# Patient Record
Sex: FEMALE | Race: WHITE | Employment: FULL TIME | URBAN - METROPOLITAN AREA
[De-identification: names, ages, dates, MRNs, and addresses within clinical notes are randomized per-mention and may not be internally consistent; named-entity substitution may affect disease eponyms.]

---

## 2017-07-13 RX ORDER — LEVOTHYROXINE SODIUM 0.05 MG/1
TABLET ORAL
Qty: 30 TABLET | Refills: 6 | Status: SHIPPED | OUTPATIENT
Start: 2017-07-13 | End: 2017-12-28 | Stop reason: SDUPTHER

## 2017-09-07 ENCOUNTER — OFFICE VISIT (OUTPATIENT)
Dept: INTERNAL MEDICINE CLINIC | Age: 52
End: 2017-09-07

## 2017-09-07 VITALS
WEIGHT: 119 LBS | DIASTOLIC BLOOD PRESSURE: 68 MMHG | HEART RATE: 72 BPM | BODY MASS INDEX: 19.83 KG/M2 | RESPIRATION RATE: 14 BRPM | SYSTOLIC BLOOD PRESSURE: 100 MMHG | HEIGHT: 65 IN

## 2017-09-07 DIAGNOSIS — M32.13 SYSTEMIC LUPUS ERYTHEMATOSUS WITH LUNG INVOLVEMENT, UNSPECIFIED SLE TYPE (HCC): ICD-10-CM

## 2017-09-07 DIAGNOSIS — Z00.00 PREVENTATIVE HEALTH CARE: Primary | ICD-10-CM

## 2017-09-07 DIAGNOSIS — F41.9 ANXIETY: ICD-10-CM

## 2017-09-07 PROCEDURE — 99396 PREV VISIT EST AGE 40-64: CPT | Performed by: INTERNAL MEDICINE

## 2017-09-07 RX ORDER — PAROXETINE 10 MG/1
10 TABLET, FILM COATED ORAL DAILY
Qty: 30 TABLET | Refills: 11 | Status: SHIPPED | OUTPATIENT
Start: 2017-09-07 | End: 2018-06-25 | Stop reason: SINTOL

## 2017-09-07 ASSESSMENT — PATIENT HEALTH QUESTIONNAIRE - PHQ9
2. FEELING DOWN, DEPRESSED OR HOPELESS: 0
1. LITTLE INTEREST OR PLEASURE IN DOING THINGS: 0
SUM OF ALL RESPONSES TO PHQ QUESTIONS 1-9: 0
SUM OF ALL RESPONSES TO PHQ9 QUESTIONS 1 & 2: 0

## 2017-10-05 DIAGNOSIS — M32.13 SYSTEMIC LUPUS ERYTHEMATOSUS WITH LUNG INVOLVEMENT, UNSPECIFIED SLE TYPE (HCC): Primary | ICD-10-CM

## 2017-10-05 RX ORDER — PREDNISONE 10 MG/1
TABLET ORAL
Qty: 20 TABLET | Refills: 0 | Status: SHIPPED | OUTPATIENT
Start: 2017-10-05 | End: 2017-10-15

## 2017-11-28 ENCOUNTER — OFFICE VISIT (OUTPATIENT)
Dept: INTERNAL MEDICINE CLINIC | Age: 52
End: 2017-11-28

## 2017-11-28 VITALS
HEART RATE: 58 BPM | SYSTOLIC BLOOD PRESSURE: 102 MMHG | RESPIRATION RATE: 14 BRPM | BODY MASS INDEX: 18.94 KG/M2 | OXYGEN SATURATION: 98 % | DIASTOLIC BLOOD PRESSURE: 62 MMHG | WEIGHT: 113.8 LBS

## 2017-11-28 DIAGNOSIS — F41.9 ANXIETY: Primary | ICD-10-CM

## 2017-11-28 PROCEDURE — 99213 OFFICE O/P EST LOW 20 MIN: CPT | Performed by: INTERNAL MEDICINE

## 2017-11-28 RX ORDER — LORAZEPAM 0.5 MG/1
0.5 TABLET ORAL EVERY 6 HOURS PRN
Qty: 20 TABLET | Refills: 0 | Status: SHIPPED | OUTPATIENT
Start: 2017-11-28 | End: 2018-06-25 | Stop reason: SDUPTHER

## 2017-11-28 NOTE — PROGRESS NOTES
Reji Garcia  1965 11/28/17    SUBJECTIVE:    Pt has been under a great deal of stress with her son's mental illness. She has had anxiety and panic attacks. She went to the [facility] ER with CP, SB, pre-syncope. She was found to have a panic attack. She has been seeing a counselor with some benefit. She denies SI/HI    OBJECTIVE:    /62   Pulse 58   Resp 14   Wt 113 lb 12.8 oz (51.6 kg)   SpO2 98%   Breastfeeding? No   BMI 18.94 kg/m²     Physical Exam    ASSESSMENT:    1. Anxiety        PLAN:    Candy Diaz was seen today for palpitations. Diagnoses and all orders for this visit:    Anxiety - discussed at length. Strongly encourage paxil, and use ativan PRN panic attacks. Cont counseling, will f/u in one month    Other orders  -     LORazepam (ATIVAN) 0.5 MG tablet; Take 1 tablet by mouth every 6 hours as needed for Anxiety .

## 2017-12-28 ENCOUNTER — OFFICE VISIT (OUTPATIENT)
Dept: INTERNAL MEDICINE CLINIC | Age: 52
End: 2017-12-28

## 2017-12-28 VITALS
DIASTOLIC BLOOD PRESSURE: 68 MMHG | WEIGHT: 115 LBS | HEART RATE: 76 BPM | RESPIRATION RATE: 16 BRPM | BODY MASS INDEX: 19.14 KG/M2 | SYSTOLIC BLOOD PRESSURE: 102 MMHG

## 2017-12-28 DIAGNOSIS — E03.4 HYPOTHYROIDISM DUE TO ACQUIRED ATROPHY OF THYROID: ICD-10-CM

## 2017-12-28 DIAGNOSIS — F41.9 ANXIETY: Primary | ICD-10-CM

## 2017-12-28 PROCEDURE — 99213 OFFICE O/P EST LOW 20 MIN: CPT | Performed by: INTERNAL MEDICINE

## 2017-12-28 RX ORDER — LEVOTHYROXINE SODIUM 0.05 MG/1
TABLET ORAL
Qty: 30 TABLET | Refills: 11 | Status: SHIPPED | OUTPATIENT
Start: 2017-12-28 | End: 2019-01-18 | Stop reason: SDUPTHER

## 2017-12-28 NOTE — PROGRESS NOTES
Zechariah Espino  1965 12/28/17     SUBJECTIVE:      Counseling as been very beneficial for the pt. She has been on the paxil 5mg - she did not tolerate the higher dose as it caused dilated pupils, tremulousness. She took ativan initially, but has not needed more. OBJECTIVE:    /68   Pulse 76   Resp 16   Wt 115 lb (52.2 kg)   LMP  (LMP Unknown)   Breastfeeding? No   BMI 19.14 kg/m²     Physical Exam    ASSESSMENT:    1. Anxiety    2. Hypothyroidism due to acquired atrophy of thyroid        PLAN:    Ashley Hill was seen today for 1 month follow-up. Diagnoses and all orders for this visit:    Anxiety - much improved.  Cont on paxil at the low dose; cont counseling    Hypothyroidism due to acquired atrophy of thyroid    Other orders  -     levothyroxine (SYNTHROID) 50 MCG tablet; TAKE 1 TABLET BY MOUTH ONE TIME A DAY

## 2018-01-15 ENCOUNTER — OFFICE VISIT (OUTPATIENT)
Dept: INTERNAL MEDICINE CLINIC | Age: 53
End: 2018-01-15

## 2018-01-15 VITALS
DIASTOLIC BLOOD PRESSURE: 74 MMHG | RESPIRATION RATE: 14 BRPM | HEART RATE: 56 BPM | OXYGEN SATURATION: 99 % | SYSTOLIC BLOOD PRESSURE: 124 MMHG

## 2018-01-15 DIAGNOSIS — H69.83 DYSFUNCTION OF BOTH EUSTACHIAN TUBES: Primary | ICD-10-CM

## 2018-01-15 PROCEDURE — 99213 OFFICE O/P EST LOW 20 MIN: CPT | Performed by: INTERNAL MEDICINE

## 2018-01-15 RX ORDER — PREDNISONE 10 MG/1
TABLET ORAL
Qty: 20 TABLET | Refills: 0 | Status: SHIPPED | OUTPATIENT
Start: 2018-01-15 | End: 2018-01-25

## 2018-01-15 RX ORDER — FLUTICASONE PROPIONATE 50 MCG
2 SPRAY, SUSPENSION (ML) NASAL DAILY
Qty: 1 BOTTLE | Refills: 5 | Status: SHIPPED | OUTPATIENT
Start: 2018-01-15 | End: 2020-03-27 | Stop reason: CLARIF

## 2018-01-15 NOTE — PROGRESS NOTES
Gin Arnold  1965 01/16/18    SUBJECTIVE:    Pt woke with head congestion, ear pressure, PND, tinnitus, rhinorrhea, nasal congestion, subjective fever, fatigue, decreased hearing. She denies sinus pain, ear drainage.,     She has used sudafed, meloxicam, claritin    OBJECTIVE:    /74   Pulse 56   Resp 14   LMP  (LMP Unknown)   SpO2 99%   Breastfeeding? No     Physical Exam   Constitutional: She appears well-developed. HENT:   Right Ear: External ear normal.   Left Ear: External ear normal.   Nose: Nose normal.   Mouth/Throat: No oropharyngeal exudate. Eyes: Conjunctivae are normal.   Cardiovascular: Normal rate, regular rhythm and normal heart sounds. Pulmonary/Chest: Effort normal and breath sounds normal.   Lymphadenopathy:     She has no cervical adenopathy. Neurological: She is alert. ASSESSMENT:    1. Dysfunction of both eustachian tubes        PLAN:    Dotti Blizzard was seen today for ear fullness. Diagnoses and all orders for this visit:    Dysfunction of both eustachian tubes - Tx with sudafed, prednisone, and flonase. Avoid sinus rinse  -     predniSONE (DELTASONE) 10 MG tablet;  Take 4 tablets daily for 2 days, then 3 tablets daily for 2 days, then two tablets daily for 2 days, then one tablet daily for 2 days  -     fluticasone (FLONASE) 50 MCG/ACT nasal spray; 2 sprays by Nasal route daily

## 2018-01-22 ENCOUNTER — TELEPHONE (OUTPATIENT)
Dept: INTERNAL MEDICINE CLINIC | Age: 53
End: 2018-01-22

## 2018-01-22 DIAGNOSIS — H93.11 TINNITUS OF RIGHT EAR: Primary | ICD-10-CM

## 2018-01-22 NOTE — TELEPHONE ENCOUNTER
Patient calls- she has completed all recommendations from last OV regarding ringing in both ears. Shortly after starting the Prednisone the ringing in left ear was gone. She still has ringing in right ear. She denies any cold symptoms. Still has constant PND. Patient wanted to know if she should see ENT regarding the ringing in her ear still and what can she do regarding the PND. Please advise.

## 2018-06-25 ENCOUNTER — OFFICE VISIT (OUTPATIENT)
Dept: INTERNAL MEDICINE CLINIC | Age: 53
End: 2018-06-25

## 2018-06-25 VITALS
SYSTOLIC BLOOD PRESSURE: 114 MMHG | DIASTOLIC BLOOD PRESSURE: 70 MMHG | WEIGHT: 115.1 LBS | RESPIRATION RATE: 14 BRPM | HEART RATE: 54 BPM | BODY MASS INDEX: 19.15 KG/M2 | OXYGEN SATURATION: 99 %

## 2018-06-25 DIAGNOSIS — F41.9 ANXIETY: Primary | ICD-10-CM

## 2018-06-25 PROCEDURE — 99213 OFFICE O/P EST LOW 20 MIN: CPT | Performed by: INTERNAL MEDICINE

## 2018-06-25 RX ORDER — LORAZEPAM 0.5 MG/1
0.5 TABLET ORAL EVERY 6 HOURS PRN
Qty: 20 TABLET | Refills: 0 | Status: SHIPPED | OUTPATIENT
Start: 2018-06-25 | End: 2018-07-30 | Stop reason: SDUPTHER

## 2018-07-30 ENCOUNTER — OFFICE VISIT (OUTPATIENT)
Dept: INTERNAL MEDICINE CLINIC | Age: 53
End: 2018-07-30

## 2018-07-30 VITALS
OXYGEN SATURATION: 99 % | BODY MASS INDEX: 18.21 KG/M2 | SYSTOLIC BLOOD PRESSURE: 94 MMHG | DIASTOLIC BLOOD PRESSURE: 74 MMHG | HEART RATE: 53 BPM | WEIGHT: 109.4 LBS

## 2018-07-30 DIAGNOSIS — F41.9 ANXIETY: ICD-10-CM

## 2018-07-30 PROCEDURE — 99213 OFFICE O/P EST LOW 20 MIN: CPT | Performed by: INTERNAL MEDICINE

## 2018-07-30 RX ORDER — VENLAFAXINE HYDROCHLORIDE 75 MG/1
75 CAPSULE, EXTENDED RELEASE ORAL DAILY
Qty: 30 CAPSULE | Refills: 5 | Status: SHIPPED | OUTPATIENT
Start: 2018-07-30 | End: 2018-09-06

## 2018-07-30 RX ORDER — LORAZEPAM 0.5 MG/1
0.5 TABLET ORAL EVERY 6 HOURS PRN
Qty: 20 TABLET | Refills: 0 | Status: SHIPPED | OUTPATIENT
Start: 2018-07-30 | End: 2018-09-06 | Stop reason: SDUPTHER

## 2018-07-30 RX ORDER — VENLAFAXINE HYDROCHLORIDE 37.5 MG/1
37.5 CAPSULE, EXTENDED RELEASE ORAL DAILY
Qty: 7 CAPSULE | Refills: 0 | Status: SHIPPED | OUTPATIENT
Start: 2018-07-30 | End: 2018-09-06

## 2018-09-06 ENCOUNTER — OFFICE VISIT (OUTPATIENT)
Dept: INTERNAL MEDICINE CLINIC | Age: 53
End: 2018-09-06

## 2018-09-06 VITALS
DIASTOLIC BLOOD PRESSURE: 70 MMHG | SYSTOLIC BLOOD PRESSURE: 104 MMHG | HEART RATE: 54 BPM | WEIGHT: 106.6 LBS | OXYGEN SATURATION: 100 % | RESPIRATION RATE: 18 BRPM | BODY MASS INDEX: 17.74 KG/M2

## 2018-09-06 DIAGNOSIS — E03.4 HYPOTHYROIDISM DUE TO ACQUIRED ATROPHY OF THYROID: ICD-10-CM

## 2018-09-06 DIAGNOSIS — Z00.00 ENCOUNTER FOR PREVENTIVE CARE: Primary | ICD-10-CM

## 2018-09-06 DIAGNOSIS — F41.9 ANXIETY: ICD-10-CM

## 2018-09-06 PROCEDURE — 99396 PREV VISIT EST AGE 40-64: CPT | Performed by: INTERNAL MEDICINE

## 2018-09-06 RX ORDER — BUSPIRONE HYDROCHLORIDE 5 MG/1
5 TABLET ORAL 2 TIMES DAILY
Qty: 60 TABLET | Refills: 3 | Status: SHIPPED | OUTPATIENT
Start: 2018-09-06 | End: 2018-10-12 | Stop reason: SDUPTHER

## 2018-09-06 RX ORDER — LORAZEPAM 0.5 MG/1
0.5 TABLET ORAL EVERY 6 HOURS PRN
Qty: 20 TABLET | Refills: 0 | Status: SHIPPED | OUTPATIENT
Start: 2018-09-06 | End: 2019-05-14 | Stop reason: SDUPTHER

## 2018-10-12 ENCOUNTER — OFFICE VISIT (OUTPATIENT)
Dept: INTERNAL MEDICINE CLINIC | Age: 53
End: 2018-10-12
Payer: COMMERCIAL

## 2018-10-12 VITALS
BODY MASS INDEX: 18.27 KG/M2 | SYSTOLIC BLOOD PRESSURE: 94 MMHG | WEIGHT: 109.8 LBS | DIASTOLIC BLOOD PRESSURE: 56 MMHG | OXYGEN SATURATION: 99 % | HEART RATE: 57 BPM

## 2018-10-12 DIAGNOSIS — F41.9 ANXIETY: ICD-10-CM

## 2018-10-12 PROCEDURE — 99213 OFFICE O/P EST LOW 20 MIN: CPT | Performed by: INTERNAL MEDICINE

## 2018-10-12 RX ORDER — BUSPIRONE HYDROCHLORIDE 10 MG/1
10 TABLET ORAL 2 TIMES DAILY
Qty: 60 TABLET | Refills: 11 | Status: SHIPPED | OUTPATIENT
Start: 2018-10-12 | End: 2019-05-14 | Stop reason: ALTCHOICE

## 2018-10-12 ASSESSMENT — PATIENT HEALTH QUESTIONNAIRE - PHQ9
1. LITTLE INTEREST OR PLEASURE IN DOING THINGS: 0
SUM OF ALL RESPONSES TO PHQ QUESTIONS 1-9: 0
2. FEELING DOWN, DEPRESSED OR HOPELESS: 0
SUM OF ALL RESPONSES TO PHQ QUESTIONS 1-9: 0
SUM OF ALL RESPONSES TO PHQ9 QUESTIONS 1 & 2: 0

## 2018-10-19 ENCOUNTER — TELEPHONE (OUTPATIENT)
Dept: INTERNAL MEDICINE CLINIC | Age: 53
End: 2018-10-19

## 2019-01-18 RX ORDER — LEVOTHYROXINE SODIUM 0.05 MG/1
TABLET ORAL
Qty: 30 TABLET | Refills: 10 | Status: SHIPPED | OUTPATIENT
Start: 2019-01-18 | End: 2019-12-10 | Stop reason: SDUPTHER

## 2019-01-20 ENCOUNTER — HOSPITAL ENCOUNTER (EMERGENCY)
Age: 54
Discharge: HOME | End: 2019-01-20
Attending: EMERGENCY MEDICINE
Payer: COMMERCIAL

## 2019-01-20 ENCOUNTER — APPOINTMENT (OUTPATIENT)
Dept: GENERAL RADIOLOGY | Age: 54
End: 2019-01-20
Payer: COMMERCIAL

## 2019-01-20 ENCOUNTER — APPOINTMENT (OUTPATIENT)
Dept: ULTRASOUND IMAGING | Age: 54
End: 2019-01-20
Payer: COMMERCIAL

## 2019-01-20 VITALS
BODY MASS INDEX: 19.16 KG/M2 | DIASTOLIC BLOOD PRESSURE: 74 MMHG | RESPIRATION RATE: 16 BRPM | WEIGHT: 115 LBS | TEMPERATURE: 97.8 F | HEIGHT: 65 IN | HEART RATE: 68 BPM | SYSTOLIC BLOOD PRESSURE: 130 MMHG | OXYGEN SATURATION: 98 %

## 2019-01-20 DIAGNOSIS — S93.601A SPRAIN OF RIGHT FOOT, INITIAL ENCOUNTER: Primary | ICD-10-CM

## 2019-01-20 PROCEDURE — 73630 X-RAY EXAM OF FOOT: CPT

## 2019-01-20 PROCEDURE — 99284 EMERGENCY DEPT VISIT MOD MDM: CPT

## 2019-01-20 PROCEDURE — 6370000000 HC RX 637 (ALT 250 FOR IP): Performed by: EMERGENCY MEDICINE

## 2019-01-20 PROCEDURE — 6360000002 HC RX W HCPCS: Performed by: EMERGENCY MEDICINE

## 2019-01-20 PROCEDURE — 93971 EXTREMITY STUDY: CPT

## 2019-01-20 RX ORDER — ACETAMINOPHEN AND CODEINE PHOSPHATE 300; 30 MG/1; MG/1
1 TABLET ORAL EVERY 4 HOURS PRN
Qty: 15 TABLET | Refills: 0 | Status: SHIPPED | OUTPATIENT
Start: 2019-01-20 | End: 2019-01-25

## 2019-01-20 RX ORDER — ACETAMINOPHEN AND CODEINE PHOSPHATE 300; 30 MG/1; MG/1
1 TABLET ORAL ONCE
Status: COMPLETED | OUTPATIENT
Start: 2019-01-20 | End: 2019-01-20

## 2019-01-20 RX ORDER — ONDANSETRON 4 MG/1
4 TABLET, ORALLY DISINTEGRATING ORAL ONCE
Status: COMPLETED | OUTPATIENT
Start: 2019-01-20 | End: 2019-01-20

## 2019-01-20 RX ADMIN — ACETAMINOPHEN AND CODEINE PHOSPHATE 1 TABLET: 300; 30 TABLET ORAL at 21:29

## 2019-01-20 RX ADMIN — ONDANSETRON 4 MG: 4 TABLET, ORALLY DISINTEGRATING ORAL at 21:29

## 2019-01-20 ASSESSMENT — PAIN DESCRIPTION - PAIN TYPE: TYPE: ACUTE PAIN

## 2019-01-20 ASSESSMENT — PAIN SCALES - GENERAL
PAINLEVEL_OUTOF10: 9
PAINLEVEL_OUTOF10: 9

## 2019-01-20 ASSESSMENT — PAIN DESCRIPTION - ORIENTATION: ORIENTATION: RIGHT

## 2019-05-14 ENCOUNTER — OFFICE VISIT (OUTPATIENT)
Dept: INTERNAL MEDICINE CLINIC | Age: 54
End: 2019-05-14
Payer: COMMERCIAL

## 2019-05-14 VITALS
DIASTOLIC BLOOD PRESSURE: 60 MMHG | OXYGEN SATURATION: 98 % | WEIGHT: 116.8 LBS | RESPIRATION RATE: 14 BRPM | HEART RATE: 64 BPM | BODY MASS INDEX: 19.44 KG/M2 | SYSTOLIC BLOOD PRESSURE: 102 MMHG

## 2019-05-14 DIAGNOSIS — F41.9 ANXIETY: ICD-10-CM

## 2019-05-14 PROCEDURE — 99213 OFFICE O/P EST LOW 20 MIN: CPT | Performed by: INTERNAL MEDICINE

## 2019-05-14 RX ORDER — LORAZEPAM 0.5 MG/1
0.5 TABLET ORAL EVERY 6 HOURS PRN
Qty: 20 TABLET | Refills: 0 | Status: SHIPPED | OUTPATIENT
Start: 2019-05-14 | End: 2019-07-01 | Stop reason: SDUPTHER

## 2019-05-14 ASSESSMENT — PATIENT HEALTH QUESTIONNAIRE - PHQ9
1. LITTLE INTEREST OR PLEASURE IN DOING THINGS: 0
SUM OF ALL RESPONSES TO PHQ QUESTIONS 1-9: 0
SUM OF ALL RESPONSES TO PHQ QUESTIONS 1-9: 0
SUM OF ALL RESPONSES TO PHQ9 QUESTIONS 1 & 2: 0
2. FEELING DOWN, DEPRESSED OR HOPELESS: 0

## 2019-06-07 ENCOUNTER — OFFICE VISIT (OUTPATIENT)
Dept: INTERNAL MEDICINE CLINIC | Age: 54
End: 2019-06-07
Payer: COMMERCIAL

## 2019-06-07 VITALS — OXYGEN SATURATION: 98 % | HEART RATE: 74 BPM | RESPIRATION RATE: 14 BRPM

## 2019-06-07 DIAGNOSIS — J00 ACUTE NASOPHARYNGITIS: Primary | ICD-10-CM

## 2019-06-07 PROCEDURE — 99213 OFFICE O/P EST LOW 20 MIN: CPT | Performed by: INTERNAL MEDICINE

## 2019-06-07 RX ORDER — PREDNISONE 10 MG/1
TABLET ORAL
Qty: 20 TABLET | Refills: 0 | Status: SHIPPED | OUTPATIENT
Start: 2019-06-07 | End: 2019-09-06 | Stop reason: ALTCHOICE

## 2019-06-07 RX ORDER — AZITHROMYCIN 250 MG/1
TABLET, FILM COATED ORAL
Qty: 1 PACKET | Refills: 0 | Status: SHIPPED | OUTPATIENT
Start: 2019-06-07 | End: 2019-09-06 | Stop reason: ALTCHOICE

## 2019-06-07 NOTE — PROGRESS NOTES
Gin Valdez [facility]  1965 06/07/19    SUBJECTIVE:      Pt with 8 days of PND, subjective fevers, loss of hearing on the right, tinittis on the right fatigue, SOB, poor appetite, facial swelling (but not tenderness), rhinorrhea with clear mucous. She denies cough, wheezing. She has used sudafed, claritin D.    OBJECTIVE:    Pulse 74   Resp 14   LMP  (LMP Unknown)   SpO2 98%     Physical Exam   Constitutional: She appears well-developed. HENT:   Right Ear: External ear normal.   Left Ear: External ear normal.   Nose: Nose normal.   Mouth/Throat: No oropharyngeal exudate. Eyes: Conjunctivae are normal.   Cardiovascular: Normal rate, regular rhythm and normal heart sounds. Pulmonary/Chest: Effort normal and breath sounds normal.   Lymphadenopathy:     She has no cervical adenopathy. Neurological: She is alert. ASSESSMENT:    1. Acute nasopharyngitis        PLAN:    Isabela Ho was seen today for other, dizziness and facial swelling. Diagnoses and all orders for this visit:    Acute nasopharyngitis - likely URI. Tx with prednisone and claritin d which should help ETD. If she develops fevers she can take the azith     Other orders  -     predniSONE (DELTASONE) 10 MG tablet; Take 4 tablets daily for 2 days, then 3 tablets daily for 2 days, then two tablets daily for 2 days, then one tablet daily for 2 days  -     azithromycin (ZITHROMAX Z-ROSEANNA) 250 MG tablet; Take 2 tablets (500 mg) on Day 1, and then take 1 tablet (250 mg) on days 2 through 5.

## 2019-06-11 DIAGNOSIS — H93.11 TINNITUS OF RIGHT EAR: Primary | ICD-10-CM

## 2019-06-11 DIAGNOSIS — H93.19 TINNITUS, UNSPECIFIED LATERALITY: ICD-10-CM

## 2019-06-11 DIAGNOSIS — H92.09 EAR PAIN, REFERRED, UNSPECIFIED LATERALITY: ICD-10-CM

## 2019-07-01 DIAGNOSIS — F41.9 ANXIETY: ICD-10-CM

## 2019-07-01 RX ORDER — LORAZEPAM 0.5 MG/1
0.5 TABLET ORAL EVERY 6 HOURS PRN
Qty: 20 TABLET | Refills: 0 | Status: SHIPPED | OUTPATIENT
Start: 2019-07-01 | End: 2019-07-29 | Stop reason: SDUPTHER

## 2019-07-01 RX ORDER — MELOXICAM 15 MG/1
15 TABLET ORAL DAILY PRN
Qty: 30 TABLET | Refills: 11 | Status: SHIPPED | OUTPATIENT
Start: 2019-07-01 | End: 2020-07-21 | Stop reason: SDUPTHER

## 2019-07-29 DIAGNOSIS — F41.9 ANXIETY: ICD-10-CM

## 2019-07-29 RX ORDER — LORAZEPAM 0.5 MG/1
0.5 TABLET ORAL EVERY 6 HOURS PRN
Qty: 20 TABLET | Refills: 0 | Status: SHIPPED | OUTPATIENT
Start: 2019-07-29 | End: 2019-09-06 | Stop reason: SDUPTHER

## 2019-09-03 DIAGNOSIS — F41.9 ANXIETY: ICD-10-CM

## 2019-09-03 RX ORDER — LORAZEPAM 0.5 MG/1
0.5 TABLET ORAL EVERY 6 HOURS PRN
Qty: 20 TABLET | Refills: 0 | OUTPATIENT
Start: 2019-09-03 | End: 2019-10-03

## 2019-09-06 ENCOUNTER — OFFICE VISIT (OUTPATIENT)
Dept: INTERNAL MEDICINE CLINIC | Age: 54
End: 2019-09-06
Payer: COMMERCIAL

## 2019-09-06 VITALS
HEART RATE: 66 BPM | OXYGEN SATURATION: 99 % | SYSTOLIC BLOOD PRESSURE: 96 MMHG | DIASTOLIC BLOOD PRESSURE: 62 MMHG | RESPIRATION RATE: 14 BRPM

## 2019-09-06 DIAGNOSIS — F41.9 ANXIETY: ICD-10-CM

## 2019-09-06 PROCEDURE — 99213 OFFICE O/P EST LOW 20 MIN: CPT | Performed by: INTERNAL MEDICINE

## 2019-09-06 RX ORDER — FLUOXETINE 10 MG/1
10 CAPSULE ORAL DAILY
Qty: 30 CAPSULE | Refills: 5 | Status: SHIPPED | OUTPATIENT
Start: 2019-09-06 | End: 2019-10-15

## 2019-09-06 RX ORDER — LORAZEPAM 0.5 MG/1
0.5 TABLET ORAL EVERY 6 HOURS PRN
Qty: 20 TABLET | Refills: 0 | Status: SHIPPED | OUTPATIENT
Start: 2019-09-06 | End: 2019-10-06

## 2019-09-06 NOTE — PROGRESS NOTES
Gin Henderson [place]  1965 09/06/19    SUBJECTIVE:      Pt has been having benefit with ativan for anxiety. She did not tolerate zoloft, paxil, buspar, and effexor. OBJECTIVE:    BP 96/62   Pulse 66   Resp 14   LMP  (LMP Unknown)   SpO2 99%     Physical Exam    ASSESSMENT:    1. Anxiety        PLAN:    Poncho Randall was seen today for discuss medications. Diagnoses and all orders for this visit:    Anxiety- I will start the patient on Prozac 10 mg daily. Also will use Ativan as needed. -     LORazepam (ATIVAN) 0.5 MG tablet; Take 1 tablet by mouth every 6 hours as needed for Anxiety for up to 30 days. Other orders  -     FLUoxetine (PROZAC) 10 MG capsule;  Take 1 capsule by mouth daily

## 2019-10-15 ENCOUNTER — OFFICE VISIT (OUTPATIENT)
Dept: INTERNAL MEDICINE CLINIC | Age: 54
End: 2019-10-15
Payer: COMMERCIAL

## 2019-10-15 VITALS
DIASTOLIC BLOOD PRESSURE: 62 MMHG | SYSTOLIC BLOOD PRESSURE: 118 MMHG | BODY MASS INDEX: 19.13 KG/M2 | WEIGHT: 114.8 LBS | HEART RATE: 61 BPM | OXYGEN SATURATION: 98 % | HEIGHT: 65 IN

## 2019-10-15 DIAGNOSIS — F41.9 ANXIETY: ICD-10-CM

## 2019-10-15 DIAGNOSIS — Z00.00 ENCOUNTER FOR PREVENTIVE CARE: Primary | ICD-10-CM

## 2019-10-15 PROCEDURE — 99396 PREV VISIT EST AGE 40-64: CPT | Performed by: INTERNAL MEDICINE

## 2019-11-27 ENCOUNTER — OFFICE VISIT (OUTPATIENT)
Dept: INTERNAL MEDICINE CLINIC | Age: 54
End: 2019-11-27
Payer: COMMERCIAL

## 2019-11-27 VITALS
RESPIRATION RATE: 14 BRPM | HEART RATE: 58 BPM | DIASTOLIC BLOOD PRESSURE: 60 MMHG | OXYGEN SATURATION: 98 % | SYSTOLIC BLOOD PRESSURE: 122 MMHG

## 2019-11-27 DIAGNOSIS — M54.50 ACUTE RIGHT-SIDED LOW BACK PAIN WITHOUT SCIATICA: Primary | ICD-10-CM

## 2019-11-27 PROCEDURE — 99213 OFFICE O/P EST LOW 20 MIN: CPT | Performed by: INTERNAL MEDICINE

## 2019-11-27 RX ORDER — CYCLOBENZAPRINE HCL 5 MG
5 TABLET ORAL 3 TIMES DAILY PRN
Qty: 30 TABLET | Refills: 3 | Status: SHIPPED | OUTPATIENT
Start: 2019-11-27 | End: 2019-12-07

## 2019-12-02 ENCOUNTER — TELEPHONE (OUTPATIENT)
Dept: INTERNAL MEDICINE CLINIC | Age: 54
End: 2019-12-02

## 2019-12-02 DIAGNOSIS — M54.16 LUMBAR RADICULOPATHY: Primary | ICD-10-CM

## 2019-12-03 DIAGNOSIS — M54.16 LUMBAR RADICULOPATHY: Primary | ICD-10-CM

## 2019-12-10 RX ORDER — LEVOTHYROXINE SODIUM 0.05 MG/1
TABLET ORAL
Qty: 30 TABLET | Refills: 9 | Status: SHIPPED | OUTPATIENT
Start: 2019-12-10 | End: 2020-10-09

## 2020-02-06 ENCOUNTER — TELEPHONE (OUTPATIENT)
Dept: INTERNAL MEDICINE CLINIC | Age: 55
End: 2020-02-06

## 2020-02-28 ENCOUNTER — OFFICE VISIT (OUTPATIENT)
Dept: INTERNAL MEDICINE CLINIC | Age: 55
End: 2020-02-28
Payer: COMMERCIAL

## 2020-02-28 ENCOUNTER — TELEPHONE (OUTPATIENT)
Dept: INTERNAL MEDICINE CLINIC | Age: 55
End: 2020-02-28

## 2020-02-28 VITALS
TEMPERATURE: 98.4 F | DIASTOLIC BLOOD PRESSURE: 68 MMHG | HEART RATE: 61 BPM | BODY MASS INDEX: 18.96 KG/M2 | OXYGEN SATURATION: 98 % | HEIGHT: 65 IN | WEIGHT: 113.8 LBS | SYSTOLIC BLOOD PRESSURE: 102 MMHG

## 2020-02-28 PROCEDURE — 99213 OFFICE O/P EST LOW 20 MIN: CPT | Performed by: INTERNAL MEDICINE

## 2020-02-28 NOTE — PROGRESS NOTES
Gin Townsend  1965 02/28/20    SUBJECTIVE:    Wednesday pt developed chills, intermittent fevers, PND, dry cough, headaches, myalgias. She denies sinus pain, rhinorrhea, nasal congestion, SOB, wheezing. She has used tylenol cold and flu. OBJECTIVE:    /68 (Site: Left Upper Arm, Position: Sitting, Cuff Size: Medium Adult)   Pulse 61   Temp 98.4 °F (36.9 °C)   Ht 5' 5\" (1.651 m)   Wt 113 lb 12.8 oz (51.6 kg)   LMP  (LMP Unknown)   SpO2 98%   Breastfeeding No   BMI 18.94 kg/m²     Physical Exam  Constitutional:       Appearance: She is well-developed. HENT:      Right Ear: External ear normal.      Left Ear: External ear normal.      Nose: Nose normal.      Mouth/Throat:      Pharynx: No oropharyngeal exudate. Eyes:      Conjunctiva/sclera: Conjunctivae normal.   Cardiovascular:      Rate and Rhythm: Normal rate and regular rhythm. Heart sounds: Normal heart sounds. Pulmonary:      Effort: Pulmonary effort is normal.      Breath sounds: Normal breath sounds. Lymphadenopathy:      Cervical: No cervical adenopathy. Neurological:      Mental Status: She is alert. ASSESSMENT:    1. Upper respiratory tract infection, unspecified type        PLAN:    Arcadio Natali was seen today for cough, congestion, headache, fever and generalized body aches. Diagnoses and all orders for this visit:    Upper respiratory tract infection, unspecified type - rapid flu (-). Tx symptoms with tylenol, cough med, rest, fluids.  No indication for abx

## 2020-03-02 ENCOUNTER — TELEPHONE (OUTPATIENT)
Dept: INTERNAL MEDICINE CLINIC | Age: 55
End: 2020-03-02

## 2020-03-02 RX ORDER — BENZONATATE 100 MG/1
100-200 CAPSULE ORAL 3 TIMES DAILY PRN
Qty: 60 CAPSULE | Refills: 0 | Status: SHIPPED | OUTPATIENT
Start: 2020-03-02 | End: 2020-03-09

## 2020-03-02 NOTE — TELEPHONE ENCOUNTER
Patient called in she stated she is no better she is coughing bad, having fever, and it is now starting as sinus issues. She started she has a zpak she had on hand she started on and fever comes and goes. Patient wanted to know is there anything else she may try that may help her.

## 2020-03-03 ENCOUNTER — TELEPHONE (OUTPATIENT)
Dept: INTERNAL MEDICINE CLINIC | Age: 55
End: 2020-03-03

## 2020-03-03 RX ORDER — ALBUTEROL SULFATE 90 UG/1
2 AEROSOL, METERED RESPIRATORY (INHALATION) 4 TIMES DAILY PRN
Qty: 1 INHALER | Refills: 5 | Status: SHIPPED | OUTPATIENT
Start: 2020-03-03 | End: 2020-03-27 | Stop reason: CLARIF

## 2020-03-03 NOTE — TELEPHONE ENCOUNTER
Spoke with patient about her scripts being sent in. Patient is now stating that she is wheezing and SOB and was wondering if she can get an inhaler called in as well. Also she had diarrhea all day yesterday.

## 2020-03-05 ENCOUNTER — TELEPHONE (OUTPATIENT)
Dept: INTERNAL MEDICINE CLINIC | Age: 55
End: 2020-03-05

## 2020-03-05 RX ORDER — PREDNISONE 10 MG/1
TABLET ORAL
Qty: 20 TABLET | Refills: 0 | Status: SHIPPED | OUTPATIENT
Start: 2020-03-05 | End: 2020-03-27 | Stop reason: CLARIF

## 2020-03-27 ENCOUNTER — OFFICE VISIT (OUTPATIENT)
Dept: INTERNAL MEDICINE CLINIC | Age: 55
End: 2020-03-27
Payer: COMMERCIAL

## 2020-03-27 VITALS
RESPIRATION RATE: 16 BRPM | BODY MASS INDEX: 18.23 KG/M2 | WEIGHT: 109.4 LBS | DIASTOLIC BLOOD PRESSURE: 61 MMHG | HEIGHT: 65 IN | OXYGEN SATURATION: 98 % | HEART RATE: 55 BPM | SYSTOLIC BLOOD PRESSURE: 100 MMHG

## 2020-03-27 PROCEDURE — 99213 OFFICE O/P EST LOW 20 MIN: CPT | Performed by: NURSE PRACTITIONER

## 2020-03-27 RX ORDER — PREDNISONE 10 MG/1
TABLET ORAL
Qty: 20 TABLET | Refills: 0 | Status: SHIPPED | OUTPATIENT
Start: 2020-03-27 | End: 2020-04-14

## 2020-03-27 ASSESSMENT — ENCOUNTER SYMPTOMS
CHEST TIGHTNESS: 0
ABDOMINAL PAIN: 0
COUGH: 0
SINUS PAIN: 0
NAUSEA: 0
APNEA: 0
COLOR CHANGE: 0
VOMITING: 0
DIARRHEA: 0
SHORTNESS OF BREATH: 0
SINUS PRESSURE: 0

## 2020-03-27 ASSESSMENT — PATIENT HEALTH QUESTIONNAIRE - PHQ9
DEPRESSION UNABLE TO ASSESS: FUNCTIONAL CAPACITY MOTIVATION LIMITS ACCURACY
2. FEELING DOWN, DEPRESSED OR HOPELESS: 0
SUM OF ALL RESPONSES TO PHQ QUESTIONS 1-9: 0
SUM OF ALL RESPONSES TO PHQ QUESTIONS 1-9: 0
SUM OF ALL RESPONSES TO PHQ9 QUESTIONS 1 & 2: 0
1. LITTLE INTEREST OR PLEASURE IN DOING THINGS: 0

## 2020-03-27 NOTE — PROGRESS NOTES
Tyreltyrel Rios  1965 03/27/20    Chief Complaint   Patient presents with    Flank Pain     right flank pain sx started yesterday pt states she has no known injury        SUBJECTIVE:      Beth Huitron is a current patient of Dr Mega Zepeda who presents to the office this morning for c/o right lateral rib cage which has been ongoing over the last 48 hours. The patient states that since she has been laid off from her job, she has been keeping active cleaning and moving furniture at their home and slipped, falling onto a desk hitting her right side. This pain started the following morning. She also gets pain with deep inspiration. No chest pain or dyspnea. She has not tried anything for the pain. She also recently recovered from a fairly lengthy viral respiratory illness. Review of Systems   Constitutional: Negative for activity change, appetite change, fatigue and fever. HENT: Negative for congestion, nosebleeds, sinus pressure and sinus pain. Respiratory: Negative for apnea, cough, chest tightness and shortness of breath. Cardiovascular: Negative for chest pain and palpitations. Gastrointestinal: Negative for abdominal pain, diarrhea, nausea and vomiting. Genitourinary: Negative for difficulty urinating, flank pain and hematuria. Musculoskeletal: Positive for arthralgias. Negative for joint swelling and myalgias. Skin: Negative for color change and rash. Neurological: Negative for dizziness, light-headedness and headaches. Psychiatric/Behavioral: Negative. Negative for behavioral problems. OBJECTIVE:    /61   Pulse 55   Resp 16   Ht 5' 5\" (1.651 m)   Wt 109 lb 6.4 oz (49.6 kg)   LMP  (LMP Unknown)   SpO2 98%   BMI 18.21 kg/m²     Physical Exam  Constitutional:       General: She is not in acute distress. Appearance: She is well-developed. She is not diaphoretic. HENT:      Head: Normocephalic and atraumatic.    Neck:      Musculoskeletal: Normal range of motion and neck supple. No edema, erythema or muscular tenderness. Cardiovascular:      Rate and Rhythm: Normal rate and regular rhythm. Heart sounds: Normal heart sounds. No murmur. No friction rub. Pulmonary:      Effort: Pulmonary effort is normal. No respiratory distress. Breath sounds: Normal breath sounds. Chest:      Chest wall: Tenderness present. No deformity or crepitus. Abdominal:      General: Bowel sounds are normal.      Palpations: Abdomen is soft. Musculoskeletal: Normal range of motion. General: Tenderness present. Skin:     General: Skin is warm and dry. Capillary Refill: Capillary refill takes less than 2 seconds. Neurological:      Mental Status: She is alert and oriented to person, place, and time. Coordination: Coordination normal.   Psychiatric:         Behavior: Behavior normal.         Thought Content: Thought content normal.         ASSESSMENT:    1. Rib pain on right side        PLAN:    Livier Granger was seen today for flank pain. Diagnoses and all orders for this visit:    Rib pain on right side- less likely resp issue/pleuritic pain and more likely a contusion. Given known trauma, I will obtain CXR to r/o fracture. Pt going to use home Mobic and can also add pred taper. Practice deep breathing exercises multiple times daily for pneumonia prevention. -     predniSONE (DELTASONE) 10 MG tablet; Take 4 tablets daily for 2 days, then 3 tablets for 2 days, 2 tablets for 2 days, then 1 tablet for 2 days  -     XR CHEST STANDARD (2 VW)        RX Monitoring 9/6/2019   Periodic Controlled Substance Monitoring Possible medication side effects, risk of tolerance/dependence & alternative treatments discussed. ;No signs of potential drug abuse or diversion identified. Return if symptoms worsen or fail to improve.

## 2020-03-30 ENCOUNTER — TELEPHONE (OUTPATIENT)
Dept: INTERNAL MEDICINE CLINIC | Age: 55
End: 2020-03-30

## 2020-03-30 RX ORDER — TRAMADOL HYDROCHLORIDE 50 MG/1
50 TABLET ORAL EVERY 8 HOURS PRN
Qty: 15 TABLET | Refills: 0 | Status: SHIPPED | OUTPATIENT
Start: 2020-03-30 | End: 2020-04-04

## 2020-03-30 NOTE — TELEPHONE ENCOUNTER
Pt called stating she is still having pain when breathing and her whole front side of her ribs are painful. She stated prednisone has not touched it. Please advise.

## 2020-04-14 ENCOUNTER — TELEMEDICINE (OUTPATIENT)
Dept: INTERNAL MEDICINE CLINIC | Age: 55
End: 2020-04-14
Payer: COMMERCIAL

## 2020-04-14 PROCEDURE — 99213 OFFICE O/P EST LOW 20 MIN: CPT | Performed by: INTERNAL MEDICINE

## 2020-05-07 ENCOUNTER — HOSPITAL ENCOUNTER (OUTPATIENT)
Age: 55
Setting detail: SPECIMEN
Discharge: HOME | End: 2020-05-07

## 2020-05-07 PROCEDURE — U0002 COVID-19 LAB TEST NON-CDC: HCPCS

## 2020-05-09 LAB
SARS-COV-2: NOT DETECTED
SOURCE: NORMAL

## 2020-05-22 ENCOUNTER — TELEPHONE (OUTPATIENT)
Dept: INTERNAL MEDICINE CLINIC | Age: 55
End: 2020-05-22

## 2020-07-21 RX ORDER — MELOXICAM 15 MG/1
15 TABLET ORAL DAILY PRN
Qty: 30 TABLET | Refills: 11 | Status: SHIPPED | OUTPATIENT
Start: 2020-07-21 | End: 2021-09-14

## 2020-10-09 RX ORDER — LEVOTHYROXINE SODIUM 0.05 MG/1
TABLET ORAL
Qty: 30 TABLET | Refills: 0 | Status: SHIPPED | OUTPATIENT
Start: 2020-10-09 | End: 2020-10-19 | Stop reason: SDUPTHER

## 2020-10-19 ENCOUNTER — OFFICE VISIT (OUTPATIENT)
Dept: INTERNAL MEDICINE CLINIC | Age: 55
End: 2020-10-19
Payer: COMMERCIAL

## 2020-10-19 VITALS
RESPIRATION RATE: 18 BRPM | OXYGEN SATURATION: 100 % | BODY MASS INDEX: 18.97 KG/M2 | HEART RATE: 56 BPM | SYSTOLIC BLOOD PRESSURE: 110 MMHG | WEIGHT: 114 LBS | DIASTOLIC BLOOD PRESSURE: 66 MMHG

## 2020-10-19 PROCEDURE — 99396 PREV VISIT EST AGE 40-64: CPT | Performed by: INTERNAL MEDICINE

## 2020-10-19 PROCEDURE — 90715 TDAP VACCINE 7 YRS/> IM: CPT | Performed by: INTERNAL MEDICINE

## 2020-10-19 PROCEDURE — 90471 IMMUNIZATION ADMIN: CPT | Performed by: INTERNAL MEDICINE

## 2020-10-19 RX ORDER — LEVOTHYROXINE SODIUM 0.05 MG/1
TABLET ORAL
Qty: 30 TABLET | Refills: 11 | Status: SHIPPED | OUTPATIENT
Start: 2020-10-19 | End: 2021-11-04

## 2020-10-19 NOTE — PROGRESS NOTES
Gin Jones  1965  10/19/20    SUBJECTIVE:      Pt continues on synthroid for hypothyroidism. She has not been getting as much exercise as she had in the past.     She intermittently is using mobic for LBP. OBJECTIVE:    /66   Pulse 56   Resp 18   Wt 114 lb (51.7 kg)   LMP  (LMP Unknown)   SpO2 100%   BMI 18.97 kg/m²     Physical Exam  Constitutional:       Appearance: She is well-developed. Eyes:      General: No scleral icterus. Conjunctiva/sclera: Conjunctivae normal.   Neck:      Musculoskeletal: Neck supple. Thyroid: No thyromegaly. Trachea: No tracheal deviation. Cardiovascular:      Rate and Rhythm: Normal rate and regular rhythm. Heart sounds: No murmur. No friction rub. No gallop. Pulmonary:      Effort: No respiratory distress. Breath sounds: No wheezing or rales. Abdominal:      General: Bowel sounds are normal. There is no distension. Palpations: Abdomen is soft. There is no hepatomegaly or mass. Tenderness: There is no abdominal tenderness. There is no guarding or rebound. Lymphadenopathy:      Cervical: No cervical adenopathy. Skin:     Nails: There is no clubbing. Neurological:      Mental Status: She is alert and oriented to person, place, and time. Psychiatric:         Behavior: Behavior normal.         Judgment: Judgment normal.         ASSESSMENT:    1. Encounter for preventive care    2. Hypothyroidism due to acquired atrophy of thyroid    3. Need for Tdap vaccination        PLAN:    Tito Nassar was seen today for other. Diagnoses and all orders for this visit:    Encounter for preventive care - encourage exercise; BP at goal; up to date on gy care, mammo; up to date on flu shot; up to date on c-scope; needs shingles shot; tdap today  -     Comprehensive Metabolic Panel; Future  -     Lipid Panel; Future  -     TSH without Reflex;  Future    Hypothyroidism due to acquired atrophy of thyroid - check TSH  -     TSH

## 2020-10-22 ENCOUNTER — TELEPHONE (OUTPATIENT)
Dept: INTERNAL MEDICINE CLINIC | Age: 55
End: 2020-10-22

## 2020-10-22 NOTE — TELEPHONE ENCOUNTER
That is not an allergy - this is a local reaction due to a healthy immune response.  No danger from this

## 2020-10-22 NOTE — TELEPHONE ENCOUNTER
Pt called in stating that she had a reaction to the tdap she said the site was red, swolle, and a small rash and would like that added in her chart.

## 2020-12-15 ENCOUNTER — TELEPHONE (OUTPATIENT)
Dept: INTERNAL MEDICINE CLINIC | Age: 55
End: 2020-12-15

## 2020-12-15 NOTE — TELEPHONE ENCOUNTER
Pt called asking if she should get the COVID vaccine. Spoke w/ pt and told her yes per Dr. Lola Powell (verbal), when it's available. Spiritual Plan of Care    Description  Writer was paged to visit family facing decision about withdrawing life support. Three family members were present: wife of pt and parents of pt. Daughter, Rosaura, arrived at the very end of the visit.  and nurse were present for the conference.  explained the pt's poor prognosis, that the pt would likely never wake up, and that withdrawal of the breathing tube would likely lead to aspiration pneumonia. Mother said repeatedly, \"Dmitriy wouldn't want to live like this. This is not what he would want.\" Father relayed his experience of \"beating cancer\" and stated he wasn't ready for his son \"to go. I have plans. I have plans.\" Wife was at pt's bedside throughout holding pt's hand. Family has a Gnosticist background but are not practicing.    Assessment  Pt mother has largely accepted the prognosis and the fact that her son is not going to survive this illness. Father has not yet accepted this reality, and admitted to this. Spouse understands the medical reality, but seems unsure about withdrawing life support. She said a couple of times, \"Dmitriy wouldn't like to be like this.\" According to nurse, spouse is ready to withdraw life support but wants everyone to be comfortable.    Interventions  Subhash out father's questions about pt medical condition and prognosis. Reminded everyone that the question to answer is, What would Dmitriy want? Also reminded that timing of withdrawing life support is a decision that falls to immediate family. Counseled giving adequate time for family goodbyes. Mementos that can be used - thumbprints, etc - introduced to family but since a son was not present family decided to delay.    Recommendation  Give the family additional time to process and accept the pt's poor prognosis, to say goodbye, and to make memories using materials from the grief cart, if desired.   support was offered via page tonight or through staff  visit tomorrow.      Referral source: Consult    Reason for visit: Family Conference    Visited with: Family/Friend    Patient Assessment: Unable to assess    Family/Friend Assessment: Denial, Distrusts Process, Grief , Helpless, Support system, Sad    Family/Friend  Intervention: Advocate, Clarify Feelings,  Support, Crisis Intervention, Empathic Listening, Exploration, Grief Support, Ethical Guidance

## 2021-04-08 ENCOUNTER — VIRTUAL VISIT (OUTPATIENT)
Dept: INTERNAL MEDICINE CLINIC | Age: 56
End: 2021-04-08
Payer: COMMERCIAL

## 2021-04-08 DIAGNOSIS — J01.20 ACUTE NON-RECURRENT ETHMOIDAL SINUSITIS: Primary | ICD-10-CM

## 2021-04-08 PROCEDURE — 99213 OFFICE O/P EST LOW 20 MIN: CPT | Performed by: INTERNAL MEDICINE

## 2021-04-08 RX ORDER — AZITHROMYCIN 250 MG/1
250 TABLET, FILM COATED ORAL SEE ADMIN INSTRUCTIONS
Qty: 6 TABLET | Refills: 0 | Status: SHIPPED | OUTPATIENT
Start: 2021-04-08 | End: 2021-04-13

## 2021-04-08 ASSESSMENT — PATIENT HEALTH QUESTIONNAIRE - PHQ9
SUM OF ALL RESPONSES TO PHQ9 QUESTIONS 1 & 2: 0
SUM OF ALL RESPONSES TO PHQ QUESTIONS 1-9: 0
SUM OF ALL RESPONSES TO PHQ QUESTIONS 1-9: 0

## 2021-04-08 NOTE — PROGRESS NOTES
2021    TELEHEALTH EVALUATION -- Audio/Visual (During LTXJE-23 public health emergency)      Had to be changed to a telephone visit because of technical difficulties  HPI:    Sary Kaufman (:  1965) has requested an audio/video evaluation for the following concern(s):    Pt with ear congestion x 10 days. 2 days ago she developed fever to 100.3, myalgias, HA, PND, sinus congestion, mild dry cough, ethmoid sinus pain, rhinorrhea with clear mucous. She denies SOB, wheezing, N/V, diarrhea, loss of taste or smell. She has used sudafed with benefit. Review of Systems    Prior to Visit Medications    Medication Sig Taking? Authorizing Provider   azithromycin (ZITHROMAX) 250 MG tablet Take 1 tablet by mouth See Admin Instructions for 5 days 500mg on day 1 followed by 250mg on days 2 - 5 Yes Erik García MD   levothyroxine (SYNTHROID) 50 MCG tablet TAKE 1 TABLET BY MOUTH ONE TIME A DAY Yes Erik García MD   meloxicam (MOBIC) 15 MG tablet Take 1 tablet by mouth daily as needed for Pain Yes Erik García MD   UNABLE TO FIND Please administer two SHINGRIX vaccines 2-6 months apart  Patient not taking: Reported on 2021  rEik García MD       Social History     Tobacco Use    Smoking status: Never Smoker    Smokeless tobacco: Never Used   Substance Use Topics    Alcohol use: Yes     Alcohol/week: 5.0 standard drinks     Types: 5 Glasses of wine per week     Comment: 1 5x per week    Drug use: No          PHYSICAL EXAMINATION:    Constitutional: [x] Appears well-developed and well-nourished [x] No apparent distress      [] Abnormal-   Mental status  [x] Alert and awake  [x] Oriented to person/place/time [x]Able to follow commands             Psychiatric:       [x] Normal Affect [x] No Hallucinations      ASSESSMENT/PLAN:  1. Acute non-recurrent ethmoidal sinusitis - likely viral, but could be bacterial. Use OTC cold med and sinus rinse.  Of she has another fever or symptoms are not improving in the next few days she will take azith  - azithromycin (ZITHROMAX) 250 MG tablet; Take 1 tablet by mouth See Admin Instructions for 5 days 500mg on day 1 followed by 250mg on days 2 - 5  Dispense: 6 tablet; Refill: 0      No follow-ups on file. Gin Ribeiro, was evaluated through a synchronous (real-time) audio-video encounter. The patient (or guardian if applicable) is aware that this is a billable service. Verbal consent to proceed has been obtained within the past 12 months. The visit was conducted pursuant to the emergency declaration under the [address] [place] [address], [address] [place] [address] authority and the [email or web address] General Act. Patient identification was verified, and a caregiver was present when appropriate. The patient was located in a state where the provider was credentialed to provide care. Total time spent on this encounter: Not billed by time    --Leilani Nicole MD on 4/8/2021 at 12:47 PM    An electronic signature was used to authenticate this note.

## 2021-09-14 RX ORDER — MELOXICAM 15 MG/1
TABLET ORAL
Qty: 30 TABLET | Refills: 0 | Status: SHIPPED | OUTPATIENT
Start: 2021-09-14 | End: 2022-01-03

## 2021-10-25 ENCOUNTER — OFFICE VISIT (OUTPATIENT)
Dept: INTERNAL MEDICINE CLINIC | Age: 56
End: 2021-10-25
Payer: COMMERCIAL

## 2021-10-25 VITALS
OXYGEN SATURATION: 99 % | RESPIRATION RATE: 18 BRPM | WEIGHT: 117 LBS | BODY MASS INDEX: 19.47 KG/M2 | SYSTOLIC BLOOD PRESSURE: 110 MMHG | HEART RATE: 57 BPM | DIASTOLIC BLOOD PRESSURE: 76 MMHG

## 2021-10-25 DIAGNOSIS — M32.13 SYSTEMIC LUPUS ERYTHEMATOSUS WITH LUNG INVOLVEMENT, UNSPECIFIED SLE TYPE (HCC): ICD-10-CM

## 2021-10-25 DIAGNOSIS — M85.80 OSTEOPENIA, UNSPECIFIED LOCATION: ICD-10-CM

## 2021-10-25 DIAGNOSIS — Z00.00 ENCOUNTER FOR PREVENTIVE CARE: Primary | ICD-10-CM

## 2021-10-25 DIAGNOSIS — E03.4 HYPOTHYROIDISM DUE TO ACQUIRED ATROPHY OF THYROID: ICD-10-CM

## 2021-10-25 PROCEDURE — 99396 PREV VISIT EST AGE 40-64: CPT | Performed by: INTERNAL MEDICINE

## 2021-10-25 NOTE — PROGRESS NOTES
fatigue. Diagnoses and all orders for this visit:    Encounter for preventive care - check labs; encourage exercise; BP at goal; encourage flu shot; encourage shingles shot; up to date on covid shot; up to date oin GYN care, c-scope  -     Comprehensive Metabolic Panel; Future  -     CBC Auto Differential; Future  -     TSH without Reflex; Future  -     Lipid Panel; Future  -     HINA; Future  -     Rheumatoid Factor; Future  -     C-Reactive Protein; Future  -     Sedimentation Rate; Future  -     PTH, INTACT; Future  -     Vitamin D 25 Hydroxy; Future    Hypothyroidism due to acquired atrophy of thyroid - check labs    Systemic lupus erythematosus with lung involvement, unspecified SLE type ([facility] [address] - check labs - some of her symptoms are concerning for recurrence of the SLE  -     HINA; Future  -     Rheumatoid Factor; Future  -     C-Reactive Protein; Future  -     Sedimentation Rate; Future  -     PTH, INTACT; Future  -     Vitamin D 25 Hydroxy; Future    Osteopenia, unspecified location - check labs  -     HINA; Future  -     Rheumatoid Factor; Future  -     C-Reactive Protein; Future  -     Sedimentation Rate;  Future

## 2022-01-03 RX ORDER — MELOXICAM 15 MG/1
TABLET ORAL
Qty: 30 TABLET | Refills: 0 | Status: SHIPPED | OUTPATIENT
Start: 2022-01-03 | End: 2022-01-31

## 2022-01-17 ENCOUNTER — HOSPITAL ENCOUNTER (OUTPATIENT)
Age: 57
Setting detail: SPECIMEN
Discharge: HOME | End: 2022-01-17
Payer: COMMERCIAL

## 2022-01-17 ENCOUNTER — OFFICE VISIT (OUTPATIENT)
Dept: OBGYN | Age: 57
End: 2022-01-17
Payer: COMMERCIAL

## 2022-01-17 VITALS
HEIGHT: 65 IN | SYSTOLIC BLOOD PRESSURE: 109 MMHG | BODY MASS INDEX: 20.33 KG/M2 | DIASTOLIC BLOOD PRESSURE: 71 MMHG | WEIGHT: 122 LBS

## 2022-01-17 DIAGNOSIS — D25.1 FIBROIDS, INTRAMURAL: ICD-10-CM

## 2022-01-17 DIAGNOSIS — D06.9 CIN III (CERVICAL INTRAEPITHELIAL NEOPLASIA GRADE III) WITH SEVERE DYSPLASIA: Primary | ICD-10-CM

## 2022-01-17 PROCEDURE — 99213 OFFICE O/P EST LOW 20 MIN: CPT | Performed by: OBSTETRICS & GYNECOLOGY

## 2022-01-17 PROCEDURE — 88142 CYTOPATH C/V THIN LAYER: CPT

## 2022-01-17 PROCEDURE — 87624 HPV HI-RISK TYP POOLED RSLT: CPT

## 2022-01-17 SDOH — ECONOMIC STABILITY: FOOD INSECURITY: WITHIN THE PAST 12 MONTHS, THE FOOD YOU BOUGHT JUST DIDN'T LAST AND YOU DIDN'T HAVE MONEY TO GET MORE.: NEVER TRUE

## 2022-01-17 SDOH — ECONOMIC STABILITY: FOOD INSECURITY: WITHIN THE PAST 12 MONTHS, YOU WORRIED THAT YOUR FOOD WOULD RUN OUT BEFORE YOU GOT MONEY TO BUY MORE.: NEVER TRUE

## 2022-01-17 ASSESSMENT — ENCOUNTER SYMPTOMS
EYES NEGATIVE: 1
GASTROINTESTINAL NEGATIVE: 1
RESPIRATORY NEGATIVE: 1
ALLERGIC/IMMUNOLOGIC NEGATIVE: 1

## 2022-01-17 ASSESSMENT — SOCIAL DETERMINANTS OF HEALTH (SDOH): HOW HARD IS IT FOR YOU TO PAY FOR THE VERY BASICS LIKE FOOD, HOUSING, MEDICAL CARE, AND HEATING?: NOT HARD AT ALL

## 2022-01-17 NOTE — PROGRESS NOTES
1/17/22    Gin Power  1965    Chief Complaint   Patient presents with    Abnormal Pap Smear     pt here for 6 mth pap smear, Last pap 7/14/21 normal, pap 4/19/21 normal, LEEP 1/29/21 HSIL, Colposcopy 1/15/21 LGSIL, pap 1/5/21 LGSIL. Ankur Briones is a 61 y.o. female who presents today for evaluation of abnormal pap smear ad to follow up on fibroids. Past Medical History:   Diagnosis Date    Allergic rhinitis     Cervical herniated disc     7/14 MRI C3-4 herniation    Elevated SGOT (AST)     Fracture of sacrum (HCC)     Gastrointestinal bleeding     Herniated lumbar intervertebral disc     Hypothyroidism     Osteoarthritis     Polyp of gallbladder     Systemic lupus (HCC)        Past Surgical History:   Procedure Laterality Date    BREAST REDUCTION SURGERY  1988    FINGER SURGERY      1990       Social History     Tobacco Use    Smoking status: Never Smoker    Smokeless tobacco: Never Used   Vaping Use    Vaping Use: Never used   Substance Use Topics    Alcohol use: Yes     Alcohol/week: 5.0 standard drinks     Types: 5 Glasses of wine per week     Comment: 1 5x per week    Drug use: No       Family History   Problem Relation Age of Onset    Heart Disease Father     High Blood Pressure Father     High Cholesterol Father        Current Outpatient Medications   Medication Sig Dispense Refill    meloxicam (MOBIC) 15 MG tablet TAKE 1 TABLET BY MOUTH EVERY DAY AS NEEDED FOR PAIN 30 tablet 0    levothyroxine (SYNTHROID) 50 MCG tablet TAKE 1 TABLET BY MOUTH EVERY DAY 30 tablet 11     No current facility-administered medications for this visit. Allergies   Allergen Reactions    Morphine Other (See Comments)     Chest pain    Nsaids Nausea Only    Plaquenil [Hydroxychloroquine Sulfate]     Sulfa Antibiotics Rash    Asa [Aspirin]      GI pain       No obstetric history on file.     Immunization History   Administered Date(s) Administered    COVID-19, J&J, PF, 0.5 mL 03/24/2021    Influenza, High Dose (Fluzone 65 yrs and older) 10/26/2016    Tdap (Boostrix, Adacel) 10/19/2020       Review of Systems   Constitutional: Negative. Eyes: Negative. Respiratory: Negative. Cardiovascular: Negative. Gastrointestinal: Negative. Endocrine: Negative. Genitourinary: Negative. Musculoskeletal: Negative. Skin: Negative. Allergic/Immunologic: Negative. Neurological: Negative. Hematological: Negative. Psychiatric/Behavioral: Negative. /71   Ht 5' 5\" (1.651 m)   Wt 122 lb (55.3 kg)   LMP  (LMP Unknown)   BMI 20.30 kg/m²     Physical Exam  Exam conducted with a chaperone present. Constitutional:       Appearance: She is normal weight. HENT:      Head: Normocephalic and atraumatic. Nose: Nose normal.   Eyes:      Conjunctiva/sclera: Conjunctivae normal.   Cardiovascular:      Rate and Rhythm: Normal rate. Pulses: Normal pulses. Pulmonary:      Effort: Pulmonary effort is normal.   Abdominal:      General: Abdomen is flat. Bowel sounds are normal. There is no distension. Palpations: Abdomen is soft. There is no mass. Tenderness: There is no abdominal tenderness. Hernia: No hernia is present. There is no hernia in the left inguinal area or right inguinal area. Genitourinary:     General: Normal vulva. Exam position: Lithotomy position. Labia:         Right: No rash, tenderness or lesion. Left: No rash, tenderness or lesion. Urethra: No prolapse, urethral pain or urethral lesion. Vagina: Normal. No vaginal discharge, erythema, tenderness or lesions. Cervix: No cervical motion tenderness, discharge, friability, lesion, erythema or cervical bleeding. Uterus: Normal.       Adnexa: Right adnexa normal and left adnexa normal.        Right: No mass or tenderness. Left: No mass or tenderness.      Musculoskeletal:      Cervical back: Normal range of motion and neck supple. Lymphadenopathy:      Lower Body: No right inguinal adenopathy. No left inguinal adenopathy. Skin:     General: Skin is warm and dry. Neurological:      General: No focal deficit present. Mental Status: She is alert and oriented to person, place, and time. No results found for this visit on 01/17/22. ASSESSMENT AND PLAN   Diagnosis Orders   1. CLEMENTE III (cervical intraepithelial neoplasia grade III) with severe dysplasia  PAP SMEAR   2. Fibroids, intramural  US NON OB TRANSVAGINAL       Return in about 6 months (around 7/17/2022).     Willy Ling MD

## 2022-01-21 LAB
HPV HIGH RISK: NOT DETECTED
HPV, GENOTYPE 16: NOT DETECTED
HPV, GENOTYPE 18: NOT DETECTED

## 2022-01-31 RX ORDER — MELOXICAM 15 MG/1
TABLET ORAL
Qty: 30 TABLET | Refills: 0 | Status: SHIPPED | OUTPATIENT
Start: 2022-01-31 | End: 2022-05-23 | Stop reason: SDUPTHER

## 2022-03-04 ENCOUNTER — TELEPHONE (OUTPATIENT)
Dept: INTERNAL MEDICINE CLINIC | Age: 57
End: 2022-03-04

## 2022-03-04 NOTE — TELEPHONE ENCOUNTER
Patient called states she has what appears to be thrush under the tongue and moving upward. Would like to know if you could send something to [employer]. Also would like to know what may cause this. Please advise. Thank you!

## 2022-03-04 NOTE — TELEPHONE ENCOUNTER
I will send in nystatin swish and swallow to [facility]. I am not sure why she would have this.  If it does not resolve or if it resolves then recurs she should see me

## 2022-05-23 RX ORDER — MELOXICAM 15 MG/1
TABLET ORAL
Qty: 30 TABLET | Refills: 0 | Status: SHIPPED | OUTPATIENT
Start: 2022-05-23 | End: 2022-08-15

## 2022-07-19 ENCOUNTER — HOSPITAL ENCOUNTER (OUTPATIENT)
Age: 57
Setting detail: SPECIMEN
Discharge: HOME | End: 2022-07-19
Payer: COMMERCIAL

## 2022-07-19 ENCOUNTER — OFFICE VISIT (OUTPATIENT)
Dept: OBGYN | Age: 57
End: 2022-07-19
Payer: COMMERCIAL

## 2022-07-19 VITALS
HEIGHT: 65 IN | WEIGHT: 118 LBS | DIASTOLIC BLOOD PRESSURE: 60 MMHG | BODY MASS INDEX: 19.66 KG/M2 | SYSTOLIC BLOOD PRESSURE: 100 MMHG

## 2022-07-19 DIAGNOSIS — D06.9 CIN III (CERVICAL INTRAEPITHELIAL NEOPLASIA GRADE III) WITH SEVERE DYSPLASIA: ICD-10-CM

## 2022-07-19 DIAGNOSIS — N63.24 LUMP IN LOWER INNER QUADRANT OF LEFT BREAST: Primary | ICD-10-CM

## 2022-07-19 PROCEDURE — 99213 OFFICE O/P EST LOW 20 MIN: CPT | Performed by: OBSTETRICS & GYNECOLOGY

## 2022-07-19 PROCEDURE — 88142 CYTOPATH C/V THIN LAYER: CPT

## 2022-07-19 PROCEDURE — 87624 HPV HI-RISK TYP POOLED RSLT: CPT

## 2022-07-19 NOTE — PROGRESS NOTES
7/19/22    Gin Calderon  1965    Chief Complaint   Patient presents with    Annual Exam     Postmenopausal, No Hrt, is sexually active, pap smear was 1/17/22 showed ascus negative hpv,  pap 7/14/21 normal, pap 4/19/21 normal, LEEP 1/29/21 HSIL, Colposcopy 1/15/21 LGSIL, pap 1/5/21 LGSIL. Last mammo was 2021 normal , Last dexa was 2021 osteopenia, last colonoscopy was 2019 normal     Breast Problem     Pt c/o lump on left breast in LLIQ x 6 months. Michelle Núñez is a 61 y.o. female who presents today for evaluation of CLEMENTE 3 and left breast mass. Past Medical History:   Diagnosis Date    Allergic rhinitis     Cervical herniated disc     7/14 MRI C3-4 herniation    Elevated SGOT (AST)     Fracture of sacrum (HCC)     Gastrointestinal bleeding     Herniated lumbar intervertebral disc     HGSIL (high grade squamous intraepithelial lesion) on Pap smear of cervix     Hypothyroidism     LGSIL on Pap smear of cervix     Osteoarthritis     Osteopenia     Osteoporosis     Polyp of gallbladder     Systemic lupus [facility])        Past Surgical History:   Procedure Laterality Date    BREAST REDUCTION SURGERY  01/01/1988    ENDOMETRIAL ABLATION      ESOPHAGOGASTRODUODENOSCOPY      FINGER SURGERY      1990    LEEP      LUNG BIOPSY         Social History     Tobacco Use    Smoking status: Never    Smokeless tobacco: Never   Vaping Use    Vaping Use: Never used   Substance Use Topics    Alcohol use:  Yes     Alcohol/week: 5.0 standard drinks     Types: 5 Glasses of wine per week     Comment: 1 5x per week    Drug use: No       Family History   Problem Relation Age of Onset    Heart Disease Father     High Blood Pressure Father     High Cholesterol Father        Current Outpatient Medications   Medication Sig Dispense Refill    meloxicam (MOBIC) 15 MG tablet TAKE 1 TABLET BY MOUTH EVERY DAY AS NEEDED FOR PAIN 30 tablet 0    levothyroxine (SYNTHROID) 50 MCG tablet TAKE 1 TABLET BY MOUTH EVERY DAY 30 tablet 11     No current facility-administered medications for this visit. Allergies   Allergen Reactions    Morphine Other (See Comments)     Chest pain    Nsaids Nausea Only    Plaquenil [Hydroxychloroquine Sulfate]     Sulfa Antibiotics Rash    Asa [Aspirin]      GI pain           Immunization History   Administered Date(s) Administered    COVID-19, J&J, (age 18y+), IM, 0.5 mL 2021    Influenza, High Dose (Fluzone 65 yrs and older) 10/26/2016    Tdap (Boostrix, Adacel) 10/19/2020       Review of Systems    /60   Ht 5' 5\" (1.651 m)   Wt 118 lb (53.5 kg)   LMP  (LMP Unknown)   BMI 19.64 kg/m²     Physical Exam  Exam conducted with a chaperone present. Constitutional:       Appearance: She is normal weight. HENT:      Head: Normocephalic and atraumatic. Nose: Nose normal.   Eyes:      Conjunctiva/sclera: Conjunctivae normal.   Cardiovascular:      Rate and Rhythm: Normal rate. Pulses: Normal pulses. Pulmonary:      Effort: Pulmonary effort is normal.   Chest:   Breasts:     Left: Mass present. Comments: Mobile mass left breast inferior lower quadrant inferior to previous incision  Abdominal:      General: Abdomen is flat. Bowel sounds are normal. There is no distension. Palpations: Abdomen is soft. There is no mass. Tenderness: There is no abdominal tenderness. Hernia: No hernia is present. There is no hernia in the left inguinal area or right inguinal area. Genitourinary:     General: Normal vulva. Exam position: Lithotomy position. Labia:         Right: No rash, tenderness or lesion. Left: No rash, tenderness or lesion. Urethra: No prolapse, urethral pain or urethral lesion. Vagina: Normal. No vaginal discharge, erythema, tenderness or lesions. Cervix: No cervical motion tenderness, discharge, friability, lesion, erythema or cervical bleeding.       Uterus: Normal.       Adnexa: Right adnexa normal and left adnexa normal.        Right: No mass or tenderness. Left: No mass or tenderness. Musculoskeletal:      Cervical back: Normal range of motion and neck supple. Lymphadenopathy:      Lower Body: No right inguinal adenopathy. No left inguinal adenopathy. Skin:     General: Skin is warm and dry. Neurological:      General: No focal deficit present. Mental Status: She is alert and oriented to person, place, and time. No results found for this visit on 07/19/22. ASSESSMENT AND PLAN   Diagnosis Orders   1. Lump in lower inner quadrant of left breast  OUMOU US BREAST LEFT COMPLETE      2. CLEMENTE III (cervical intraepithelial neoplasia grade III) with severe dysplasia  PAP SMEAR        Call with results of breast ultrasound. Discussed that on exam it palpates inferior to linda breast tissue    Repeat pap 6m sooner if this pap is abnormal  No follow-ups on file.     Merari Kohli MD

## 2022-07-20 ENCOUNTER — TELEPHONE (OUTPATIENT)
Dept: OBGYN | Age: 57
End: 2022-07-20

## 2022-07-20 DIAGNOSIS — N63.0 BREAST LUMP: Primary | ICD-10-CM

## 2022-07-23 LAB
HPV HIGH RISK: DETECTED
HPV, GENOTYPE 16: NOT DETECTED
HPV, GENOTYPE 18: NOT DETECTED

## 2022-07-27 DIAGNOSIS — N63.0 BREAST LUMP: ICD-10-CM

## 2022-07-28 ENCOUNTER — PROCEDURE VISIT (OUTPATIENT)
Dept: OBGYN | Age: 57
End: 2022-07-28
Payer: COMMERCIAL

## 2022-07-28 ENCOUNTER — HOSPITAL ENCOUNTER (OUTPATIENT)
Age: 57
Setting detail: SPECIMEN
Discharge: HOME | End: 2022-07-28
Payer: COMMERCIAL

## 2022-07-28 VITALS
WEIGHT: 116 LBS | BODY MASS INDEX: 19.33 KG/M2 | SYSTOLIC BLOOD PRESSURE: 109 MMHG | HEIGHT: 65 IN | DIASTOLIC BLOOD PRESSURE: 75 MMHG

## 2022-07-28 DIAGNOSIS — D17.1 LIPOMA OF ANTERIOR CHEST WALL: Primary | ICD-10-CM

## 2022-07-28 DIAGNOSIS — R87.810 CERVICAL HIGH RISK HUMAN PAPILLOMAVIRUS (HPV) DNA TEST POSITIVE: ICD-10-CM

## 2022-07-28 DIAGNOSIS — R87.610 PAPANICOLAOU SMEAR OF CERVIX WITH ATYPICAL SQUAMOUS CELLS OF UNDETERMINED SIGNIFICANCE (ASC-US): ICD-10-CM

## 2022-07-28 PROCEDURE — 88112 CYTOPATH CELL ENHANCE TECH: CPT

## 2022-07-28 PROCEDURE — 57456 ENDOCERV CURETTAGE W/SCOPE: CPT | Performed by: OBSTETRICS & GYNECOLOGY

## 2022-07-28 PROCEDURE — 88305 TISSUE EXAM BY PATHOLOGIST: CPT

## 2022-07-28 ASSESSMENT — PATIENT HEALTH QUESTIONNAIRE - PHQ9
SUM OF ALL RESPONSES TO PHQ QUESTIONS 1-9: 0
1. LITTLE INTEREST OR PLEASURE IN DOING THINGS: 0
SUM OF ALL RESPONSES TO PHQ QUESTIONS 1-9: 0
SUM OF ALL RESPONSES TO PHQ9 QUESTIONS 1 & 2: 0
2. FEELING DOWN, DEPRESSED OR HOPELESS: 0

## 2022-07-28 NOTE — PROGRESS NOTES
Colposcopy Procedure Note    Indications: Pt presents for colposcopy secondary to Edis Smith 26 + HPV    Procedure Details   The risks and benefits of the procedure were discussed in detail with the patient. She was aware the risks may include, but are not limited to bleeding, infection and damage to surround structures. She acknowledged these risks and elected to proceed. Written consent was obtained. A speculum was placed in vagina and excellent visualization of cervix was achieved, the cervix was swabbed x3 with acetic acid solution. NO lesions were visualized. Biopsies were obtained at None. ECC was performed. But difficult to enter atrophic postmenopusal endocervix     Colposcopy was  unsatisfactory at TZ not visulaized .        Findings:  Cervix: normal atrophic  Vaginal inspection: Normal without gross visible lesions  Vulvar inspection: Normal without gross visible lesions    Specimens: ecc    Complications: none    Plan:  Specimens labeled and sent to Pathology  Aftercare instructions were discussed  Will follow up with results and treatment plan  Patient to call with any further questions or concerns    Huma George MD         Refer dr. Li Carpio (patient request) for anterior chest wall lipoma

## 2022-08-15 RX ORDER — MELOXICAM 15 MG/1
TABLET ORAL
Qty: 30 TABLET | Refills: 0 | Status: SHIPPED | OUTPATIENT
Start: 2022-08-15 | End: 2022-09-21 | Stop reason: SDUPTHER

## 2022-08-17 ENCOUNTER — TELEPHONE (OUTPATIENT)
Dept: OBGYN | Age: 57
End: 2022-08-17

## 2022-09-21 ENCOUNTER — OFFICE VISIT (OUTPATIENT)
Dept: INTERNAL MEDICINE CLINIC | Age: 57
End: 2022-09-21
Payer: COMMERCIAL

## 2022-09-21 VITALS
DIASTOLIC BLOOD PRESSURE: 62 MMHG | BODY MASS INDEX: 20.3 KG/M2 | SYSTOLIC BLOOD PRESSURE: 94 MMHG | OXYGEN SATURATION: 99 % | WEIGHT: 122 LBS | HEART RATE: 50 BPM | RESPIRATION RATE: 18 BRPM

## 2022-09-21 DIAGNOSIS — M32.13 SYSTEMIC LUPUS ERYTHEMATOSUS WITH LUNG INVOLVEMENT, UNSPECIFIED SLE TYPE (HCC): ICD-10-CM

## 2022-09-21 DIAGNOSIS — E03.4 HYPOTHYROIDISM DUE TO ACQUIRED ATROPHY OF THYROID: ICD-10-CM

## 2022-09-21 DIAGNOSIS — Z00.00 ENCOUNTER FOR PREVENTIVE CARE: ICD-10-CM

## 2022-09-21 DIAGNOSIS — Z00.00 ENCOUNTER FOR PREVENTIVE CARE: Primary | ICD-10-CM

## 2022-09-21 LAB
BASOPHILS ABSOLUTE: 0 K/UL (ref 0–0.2)
BASOPHILS RELATIVE PERCENT: 0.8 %
EOSINOPHILS ABSOLUTE: 0.2 K/UL (ref 0–0.6)
EOSINOPHILS RELATIVE PERCENT: 3.9 %
HCT VFR BLD CALC: 39.6 % (ref 36–48)
HEMOGLOBIN: 13.1 G/DL (ref 12–16)
LYMPHOCYTES ABSOLUTE: 1.4 K/UL (ref 1–5.1)
LYMPHOCYTES RELATIVE PERCENT: 36.1 %
MCH RBC QN AUTO: 31.4 PG (ref 26–34)
MCHC RBC AUTO-ENTMCNC: 33.1 G/DL (ref 31–36)
MCV RBC AUTO: 94.9 FL (ref 80–100)
MONOCYTES ABSOLUTE: 0.3 K/UL (ref 0–1.3)
MONOCYTES RELATIVE PERCENT: 7.4 %
NEUTROPHILS ABSOLUTE: 2 K/UL (ref 1.7–7.7)
NEUTROPHILS RELATIVE PERCENT: 51.8 %
PDW BLD-RTO: 12.3 % (ref 12.4–15.4)
PLATELET # BLD: 193 K/UL (ref 135–450)
PMV BLD AUTO: 8.9 FL (ref 5–10.5)
RBC # BLD: 4.17 M/UL (ref 4–5.2)
SEDIMENTATION RATE, ERYTHROCYTE: 3 MM/HR (ref 0–30)
WBC # BLD: 3.9 K/UL (ref 4–11)

## 2022-09-21 PROCEDURE — 81003 URINALYSIS AUTO W/O SCOPE: CPT | Performed by: INTERNAL MEDICINE

## 2022-09-21 PROCEDURE — 36415 COLL VENOUS BLD VENIPUNCTURE: CPT | Performed by: INTERNAL MEDICINE

## 2022-09-21 PROCEDURE — 99396 PREV VISIT EST AGE 40-64: CPT | Performed by: INTERNAL MEDICINE

## 2022-09-21 RX ORDER — LEVOTHYROXINE SODIUM 0.05 MG/1
TABLET ORAL
Qty: 30 TABLET | Refills: 11 | Status: CANCELLED | OUTPATIENT
Start: 2022-09-21

## 2022-09-21 RX ORDER — MELOXICAM 15 MG/1
TABLET ORAL
Qty: 90 TABLET | Refills: 0 | Status: SHIPPED | OUTPATIENT
Start: 2022-09-21

## 2022-09-21 NOTE — PROGRESS NOTES
Maryjo Mac  1965 09/21/22    SUBJECTIVE:      Pt complains that for the last 3 months she has had fatigue. She has been napping, sleeping more in general. She has noticed more cold intolerance. She has been gaining weight, and she has noticed more hair loss. She does have a h/o lupus, and does have itermittent pleurisy. She has not been on Tx for years for SLE> u    She denies F/C, blood in the stool, black stools, N/V, CP, SOB, rashes. She is exercising 3 times weekly. She continues to use mobic for LBP. OBJECTIVE:    BP 94/62   Pulse 50   Resp 18   Wt 122 lb (55.3 kg)   LMP  (LMP Unknown)   SpO2 99%   BMI 20.30 kg/m²     Physical Exam  Constitutional:       Appearance: She is well-developed. Eyes:      General: No scleral icterus. Conjunctiva/sclera: Conjunctivae normal.   Neck:      Thyroid: No thyromegaly. Trachea: No tracheal deviation. Cardiovascular:      Rate and Rhythm: Normal rate and regular rhythm. Heart sounds: No murmur heard. No friction rub. No gallop. Pulmonary:      Effort: No respiratory distress. Breath sounds: No wheezing or rales. Abdominal:      General: Bowel sounds are normal. There is no distension. Palpations: Abdomen is soft. There is no hepatomegaly or mass. Tenderness: There is no abdominal tenderness. There is no guarding or rebound. Musculoskeletal:      Cervical back: Neck supple. Lymphadenopathy:      Cervical: No cervical adenopathy. Skin:     Nails: There is no clubbing. Neurological:      Mental Status: She is alert and oriented to person, place, and time. Psychiatric:         Behavior: Behavior normal.         Judgment: Judgment normal.       ASSESSMENT:    1. Encounter for preventive care    2. Hypothyroidism due to acquired atrophy of thyroid    3. Systemic lupus erythematosus with lung involvement, unspecified SLE type ([facility] [address]        PLAN:    Amy Pal was seen today for fatigue.     Diagnoses and all orders for this visit:    Encounter for preventive care - check labs; BP at goal; up to date on GYN care, mammo; up to date on c-scope; cont exercise  -     meloxicam (MOBIC) 15 MG tablet; TAKE 1 TABLET BY MOUTH EVERY DAY AS NEEDED FOR PAIN  -     Comprehensive Metabolic Panel; Future  -     CBC with Auto Differential; Future  -     TSH; Future  -     T4, Free; Future  -     T3, Free; Future  -     Vitamin B12; Future  -     HINA; Future  -     C-Reactive Protein; Future  -     Sedimentation Rate; Future  -     Urinalysis; Future  -     LIPID PANEL; Future    Hypothyroidism due to acquired atrophy of thyroid - check labs; pt concerned that her current symptoms may be from hypothyroidism and that we may need to have her on brand name synthroid or armour. I am more concerned that her symptoms may be from lupus. I will check labs for both, but if thyroid looks OK will refer to rheum.   -     TSH; Future  -     T4, Free; Future  -     T3, Free; Future  -     Vitamin B12; Future    Systemic lupus erythematosus with lung involvement, unspecified SLE type (HCC)  -     meloxicam (MOBIC) 15 MG tablet; TAKE 1 TABLET BY MOUTH EVERY DAY AS NEEDED FOR PAIN  -     Comprehensive Metabolic Panel; Future  -     CBC with Auto Differential; Future  -     TSH; Future  -     T4, Free; Future  -     T3, Free; Future  -     Vitamin B12; Future  -     HINA; Future  -     C-Reactive Protein; Future  -     Sedimentation Rate; Future  -     Urinalysis;  Future

## 2022-09-22 DIAGNOSIS — Z00.00 ENCOUNTER FOR PREVENTIVE CARE: Primary | ICD-10-CM

## 2022-09-22 LAB
A/G RATIO: 2.8 (ref 1.1–2.2)
ALBUMIN SERPL-MCNC: 4.7 G/DL (ref 3.4–5)
ALP BLD-CCNC: 66 U/L (ref 40–129)
ALT SERPL-CCNC: 15 U/L (ref 10–40)
AMORPHOUS: ABNORMAL /HPF
ANION GAP SERPL CALCULATED.3IONS-SCNC: 13 MMOL/L (ref 3–16)
ANTI-NUCLEAR ANTIBODY (ANA): NEGATIVE
AST SERPL-CCNC: 20 U/L (ref 15–37)
BILIRUB SERPL-MCNC: 0.3 MG/DL (ref 0–1)
BILIRUBIN URINE: NEGATIVE
BLOOD, URINE: NEGATIVE
BUN BLDV-MCNC: 18 MG/DL (ref 7–20)
C-REACTIVE PROTEIN: <3 MG/L (ref 0–5.1)
CALCIUM SERPL-MCNC: 9.8 MG/DL (ref 8.3–10.6)
CHLORIDE BLD-SCNC: 104 MMOL/L (ref 99–110)
CHOLESTEROL, TOTAL: 180 MG/DL (ref 0–199)
CLARITY: ABNORMAL
CO2: 28 MMOL/L (ref 21–32)
COLOR: YELLOW
CREAT SERPL-MCNC: 0.7 MG/DL (ref 0.6–1.1)
CRYSTALS, UA: ABNORMAL /HPF
EPITHELIAL CELLS, UA: ABNORMAL /HPF (ref 0–5)
GFR AFRICAN AMERICAN: >60
GFR NON-AFRICAN AMERICAN: >60
GLUCOSE BLD-MCNC: 95 MG/DL (ref 70–99)
GLUCOSE URINE: NEGATIVE MG/DL
HDLC SERPL-MCNC: 79 MG/DL (ref 40–60)
KETONES, URINE: ABNORMAL MG/DL
LDL CHOLESTEROL CALCULATED: 72 MG/DL
LEUKOCYTE ESTERASE, URINE: NEGATIVE
MICROSCOPIC EXAMINATION: YES
NITRITE, URINE: NEGATIVE
PH UA: 6 (ref 5–8)
POTASSIUM SERPL-SCNC: 4.2 MMOL/L (ref 3.5–5.1)
PROTEIN UA: NEGATIVE MG/DL
RBC UA: ABNORMAL /HPF (ref 0–4)
SODIUM BLD-SCNC: 145 MMOL/L (ref 136–145)
SPECIFIC GRAVITY UA: 1.03 (ref 1–1.03)
T3 FREE: 2.3 PG/ML (ref 2.3–4.2)
T4 FREE: 1.5 NG/DL (ref 0.9–1.8)
TOTAL PROTEIN: 6.4 G/DL (ref 6.4–8.2)
TRIGL SERPL-MCNC: 144 MG/DL (ref 0–150)
TSH SERPL DL<=0.05 MIU/L-ACNC: 0.84 UIU/ML (ref 0.27–4.2)
URINE TYPE: ABNORMAL
UROBILINOGEN, URINE: 1 E.U./DL
VITAMIN B-12: 441 PG/ML (ref 211–911)
VLDLC SERPL CALC-MCNC: 29 MG/DL
WBC UA: ABNORMAL /HPF (ref 0–5)

## 2022-10-27 RX ORDER — LEVOTHYROXINE SODIUM 0.05 MG/1
TABLET ORAL
Qty: 30 TABLET | Refills: 11 | Status: SHIPPED | OUTPATIENT
Start: 2022-10-27

## 2022-12-06 ENCOUNTER — TELEPHONE (OUTPATIENT)
Dept: INTERNAL MEDICINE CLINIC | Age: 57
End: 2022-12-06

## 2022-12-06 NOTE — TELEPHONE ENCOUNTER
The patient called in stating that she is having some really bad back pain and would like to know if you could place an order for her to have some imaging done . Please advise !

## 2022-12-06 NOTE — TELEPHONE ENCOUNTER
The patient is scheduled to come in Friday December 16th . She it is not that urgent and she needs a late appt.

## 2022-12-16 ENCOUNTER — OFFICE VISIT (OUTPATIENT)
Dept: INTERNAL MEDICINE CLINIC | Age: 57
End: 2022-12-16
Payer: COMMERCIAL

## 2022-12-16 VITALS
HEART RATE: 52 BPM | OXYGEN SATURATION: 98 % | WEIGHT: 123 LBS | SYSTOLIC BLOOD PRESSURE: 104 MMHG | RESPIRATION RATE: 18 BRPM | BODY MASS INDEX: 20.47 KG/M2 | DIASTOLIC BLOOD PRESSURE: 68 MMHG

## 2022-12-16 DIAGNOSIS — R10.84 GENERALIZED ABDOMINAL PAIN: ICD-10-CM

## 2022-12-16 DIAGNOSIS — R10.84 GENERALIZED ABDOMINAL PAIN: Primary | ICD-10-CM

## 2022-12-16 LAB
BILIRUBIN URINE: NEGATIVE
BLOOD, URINE: NEGATIVE
CLARITY: CLEAR
COLOR: YELLOW
GLUCOSE URINE: NEGATIVE MG/DL
KETONES, URINE: NEGATIVE MG/DL
LEUKOCYTE ESTERASE, URINE: NEGATIVE
MICROSCOPIC EXAMINATION: NORMAL
NITRITE, URINE: NEGATIVE
PH UA: 7.5 (ref 5–8)
PROTEIN UA: NEGATIVE MG/DL
SPECIFIC GRAVITY UA: 1.01 (ref 1–1.03)
URINE TYPE: NORMAL
UROBILINOGEN, URINE: 0.2 E.U./DL

## 2022-12-16 PROCEDURE — 99214 OFFICE O/P EST MOD 30 MIN: CPT | Performed by: INTERNAL MEDICINE

## 2022-12-16 NOTE — PROGRESS NOTES
Gin Chambers Min  1965 12/16/22    SUBJECTIVE:    Pt complains of LBP. This is accompanied by abd pain, located on the waist line. This has been present for 3 weeks. She has had fatigue, nausea without vomiting. Back pain is worse with twisting, but there is pain even when she is ling on her back. She has had constipation. She denies radiation of the pain into the legs, N/W, diarrhea,  blood in the stool, black stool, F/C, hematuria, dysuria. OBJECTIVE:    /68   Pulse 52   Resp 18   Wt 123 lb (55.8 kg)   LMP  (LMP Unknown)   SpO2 98%   BMI 20.47 kg/m²     Physical Exam  Constitutional:       Appearance: She is well-developed. Eyes:      General: No scleral icterus. Conjunctiva/sclera: Conjunctivae normal.   Neck:      Thyroid: No thyromegaly. Trachea: No tracheal deviation. Cardiovascular:      Rate and Rhythm: Normal rate and regular rhythm. Heart sounds: No murmur heard. No friction rub. No gallop. Pulmonary:      Effort: No respiratory distress. Breath sounds: No wheezing or rales. Abdominal:      General: Bowel sounds are normal. There is no distension. Palpations: Abdomen is soft. There is no hepatomegaly or mass. Tenderness: There is abdominal tenderness (very mild illibeth-umbilical). There is no guarding or rebound. Musculoskeletal:      Cervical back: Neck supple. Lymphadenopathy:      Cervical: No cervical adenopathy. Skin:     Nails: There is no clubbing. Neurological:      Mental Status: She is alert and oriented to person, place, and time. Psychiatric:         Behavior: Behavior normal.         Judgment: Judgment normal.   Very mild tenderness in lumbar paraspinal muscles    ASSESSMENT:    1. Generalized abdominal pain        PLAN:    Nilsa Jones was seen today for back pain, abdominal pain, fatigue and nausea.     Diagnoses and all orders for this visit:    Generalized abdominal pain - I am concerned about the constitutional symptoms and the abd pain, and I do not think this is typical LBP (though there may be 2 separate issues). I will check urine studies, also check CT abd for an intra-abdominal process. I am also concerned that there may be a relationship to her SLE. If CT is (-), will consider PT for the back pain  -     Urinalysis; Future  -     Culture, Urine;  Future  -     CT ABDOMEN PELVIS WO CONTRAST Additional Contrast? Oral; Future

## 2022-12-19 LAB
ORGANISM: ABNORMAL
URINE CULTURE, ROUTINE: ABNORMAL

## 2022-12-19 RX ORDER — CEPHALEXIN 500 MG/1
500 CAPSULE ORAL 3 TIMES DAILY
Qty: 21 CAPSULE | Refills: 0 | Status: SHIPPED | OUTPATIENT
Start: 2022-12-19 | End: 2022-12-26

## 2022-12-30 ENCOUNTER — TELEPHONE (OUTPATIENT)
Dept: INTERNAL MEDICINE CLINIC | Age: 57
End: 2022-12-30

## 2022-12-30 DIAGNOSIS — G89.29 CHRONIC BILATERAL LOW BACK PAIN WITHOUT SCIATICA: Primary | ICD-10-CM

## 2022-12-30 DIAGNOSIS — M54.50 CHRONIC BILATERAL LOW BACK PAIN WITHOUT SCIATICA: Primary | ICD-10-CM

## 2022-12-30 NOTE — TELEPHONE ENCOUNTER
Patient was asked to call back on how she is feeling. States better although her SI lower back still very painful and would like to have a MRI done at [facility]. Please advise. Thank you!

## 2022-12-30 NOTE — TELEPHONE ENCOUNTER
Spoke with patient. States yes she will do PT first at [facility]. Thank you!

## 2023-01-19 ENCOUNTER — OFFICE VISIT (OUTPATIENT)
Dept: OBGYN | Age: 58
End: 2023-01-19
Payer: COMMERCIAL

## 2023-01-19 ENCOUNTER — HOSPITAL ENCOUNTER (OUTPATIENT)
Age: 58
Setting detail: SPECIMEN
Discharge: HOME | End: 2023-01-19
Payer: COMMERCIAL

## 2023-01-19 VITALS
SYSTOLIC BLOOD PRESSURE: 117 MMHG | WEIGHT: 125 LBS | HEIGHT: 65 IN | DIASTOLIC BLOOD PRESSURE: 77 MMHG | BODY MASS INDEX: 20.83 KG/M2

## 2023-01-19 DIAGNOSIS — R87.810 CERVICAL HIGH RISK HUMAN PAPILLOMAVIRUS (HPV) DNA TEST POSITIVE: ICD-10-CM

## 2023-01-19 DIAGNOSIS — Z80.3 FAMILY HISTORY OF BREAST CANCER: ICD-10-CM

## 2023-01-19 DIAGNOSIS — Z13.820 ENCOUNTER FOR OSTEOPOROSIS SCREENING IN ASYMPTOMATIC POSTMENOPAUSAL PATIENT: ICD-10-CM

## 2023-01-19 DIAGNOSIS — Z12.31 SCREENING MAMMOGRAM FOR BREAST CANCER: ICD-10-CM

## 2023-01-19 DIAGNOSIS — Z78.0 ENCOUNTER FOR OSTEOPOROSIS SCREENING IN ASYMPTOMATIC POSTMENOPAUSAL PATIENT: ICD-10-CM

## 2023-01-19 DIAGNOSIS — R87.610 PAPANICOLAOU SMEAR OF CERVIX WITH ATYPICAL SQUAMOUS CELLS OF UNDETERMINED SIGNIFICANCE (ASC-US): Primary | ICD-10-CM

## 2023-01-19 PROCEDURE — 99213 OFFICE O/P EST LOW 20 MIN: CPT | Performed by: OBSTETRICS & GYNECOLOGY

## 2023-01-19 PROCEDURE — 87624 HPV HI-RISK TYP POOLED RSLT: CPT

## 2023-01-19 SDOH — ECONOMIC STABILITY: FOOD INSECURITY: WITHIN THE PAST 12 MONTHS, THE FOOD YOU BOUGHT JUST DIDN'T LAST AND YOU DIDN'T HAVE MONEY TO GET MORE.: NEVER TRUE

## 2023-01-19 SDOH — ECONOMIC STABILITY: FOOD INSECURITY: WITHIN THE PAST 12 MONTHS, YOU WORRIED THAT YOUR FOOD WOULD RUN OUT BEFORE YOU GOT MONEY TO BUY MORE.: NEVER TRUE

## 2023-01-19 ASSESSMENT — PATIENT HEALTH QUESTIONNAIRE - PHQ9
1. LITTLE INTEREST OR PLEASURE IN DOING THINGS: 0
SUM OF ALL RESPONSES TO PHQ9 QUESTIONS 1 & 2: 0
SUM OF ALL RESPONSES TO PHQ QUESTIONS 1-9: 0
2. FEELING DOWN, DEPRESSED OR HOPELESS: 0
SUM OF ALL RESPONSES TO PHQ QUESTIONS 1-9: 0

## 2023-01-19 ASSESSMENT — SOCIAL DETERMINANTS OF HEALTH (SDOH): HOW HARD IS IT FOR YOU TO PAY FOR THE VERY BASICS LIKE FOOD, HOUSING, MEDICAL CARE, AND HEATING?: NOT HARD AT ALL

## 2023-01-19 NOTE — PROGRESS NOTES
1/19/23    Gin Mehul Severin  1965    Chief Complaint   Patient presents with    Abnormal Pap Smear     Pt here for repeat pap smear. Pt last pap smear was last pap smear 07/28/2022 ECC-negative with Predominantly Benign Squamous epithelial cells. 7/19/22 showed ascus +hpv, pap 1/17/22 ascus +hpv   Pt has questions regarding dexa order and blood work due to family hx. Terrance Feliciano is a 61 y.o. female who presents today for evaluation of asc + HPV    Past Medical History:   Diagnosis Date    Allergic rhinitis     Cervical herniated disc     7/14 MRI C3-4 herniation    Elevated SGOT (AST)     Fracture of sacrum (HCC)     Gastrointestinal bleeding     Herniated lumbar intervertebral disc     HGSIL (high grade squamous intraepithelial lesion) on Pap smear of cervix     Hypothyroidism     LGSIL on Pap smear of cervix     Osteoarthritis     Osteopenia     Osteoporosis     Polyp of gallbladder     Systemic lupus (HCC)        Past Surgical History:   Procedure Laterality Date    BREAST REDUCTION SURGERY  01/01/1988    ENDOMETRIAL ABLATION      ESOPHAGOGASTRODUODENOSCOPY      FINGER SURGERY      1990    LEEP      LUNG BIOPSY         Social History     Tobacco Use    Smoking status: Never    Smokeless tobacco: Never   Vaping Use    Vaping Use: Never used   Substance Use Topics    Alcohol use: Yes     Alcohol/week: 5.0 standard drinks     Types: 5 Glasses of wine per week     Comment: 1 5x per week    Drug use: No       Family History   Problem Relation Age of Onset    Heart Disease Father     High Blood Pressure Father     High Cholesterol Father        Current Outpatient Medications   Medication Sig Dispense Refill    levothyroxine (SYNTHROID) 50 MCG tablet TAKE 1 TABLET BY MOUTH EVERY DAY 30 tablet 11    meloxicam (MOBIC) 15 MG tablet TAKE 1 TABLET BY MOUTH EVERY DAY AS NEEDED FOR PAIN 90 tablet 0     No current facility-administered medications for this visit.        Allergies   Allergen Reactions Morphine Other (See Comments)     Chest pain    Nsaids Nausea Only    Plaquenil [Hydroxychloroquine Sulfate]     Sulfa Antibiotics Rash    Asa [Aspirin]      GI pain           Immunization History   Administered Date(s) Administered    COVID-19, J&J, (age 18y+), IM, 0.5 mL 2021    Influenza, High Dose (Fluzone 65 yrs and older) 10/26/2016    Tdap (Boostrix, Adacel) 10/19/2020       Review of Systems    /77 (Site: Left Upper Arm, Position: Sitting, Cuff Size: Medium Adult)   Ht 5' 5\" (1.651 m)   Wt 125 lb (56.7 kg)   LMP  (LMP Unknown)   BMI 20.80 kg/m²     Physical Exam  Exam conducted with a chaperone present. Constitutional:       Appearance: She is normal weight. HENT:      Head: Normocephalic and atraumatic. Nose: Nose normal.   Eyes:      Conjunctiva/sclera: Conjunctivae normal.   Cardiovascular:      Rate and Rhythm: Normal rate. Pulses: Normal pulses. Pulmonary:      Effort: Pulmonary effort is normal.   Abdominal:      General: Abdomen is flat. Bowel sounds are normal. There is no distension. Palpations: Abdomen is soft. There is no mass. Tenderness: There is no abdominal tenderness. Hernia: No hernia is present. There is no hernia in the left inguinal area or right inguinal area. Genitourinary:     General: Normal vulva. Exam position: Lithotomy position. Labia:         Right: No rash, tenderness or lesion. Left: No rash, tenderness or lesion. Urethra: No prolapse, urethral pain or urethral lesion. Vagina: Normal. No vaginal discharge, erythema, tenderness or lesions. Cervix: No cervical motion tenderness, discharge, friability, lesion, erythema or cervical bleeding. Uterus: Normal.       Adnexa: Right adnexa normal and left adnexa normal.        Right: No mass or tenderness. Left: No mass or tenderness. Musculoskeletal:      Cervical back: Normal range of motion and neck supple.    Lymphadenopathy: Lower Body: No right inguinal adenopathy. No left inguinal adenopathy. Skin:     General: Skin is warm and dry. Neurological:      General: No focal deficit present. Mental Status: She is alert and oriented to person, place, and time. No results found for this visit on 01/19/23. ASSESSMENT AND PLAN   Diagnosis Orders   1. Papanicolaou smear of cervix with atypical squamous cells of undetermined significance (ASC-US)  PAP SMEAR    OUMOU DIGITAL SCREEN W CAD BILATERAL PER PROTOCOL      2. Cervical high risk human papillomavirus (HPV) DNA test positive  PAP SMEAR    OUMOU DIGITAL SCREEN W CAD BILATERAL PER PROTOCOL      3. Screening mammogram for breast cancer  OUMOU DIGITAL SCREEN W CAD BILATERAL PER PROTOCOL      4. Encounter for osteoporosis screening in asymptomatic postmenopausal patient  DEXA BONE DENSITY AXIAL SKELETON      5. Family history of breast cancer          Refer genetic cousnleing  Return in about 6 months (around 7/19/2023).     Cory Rose MD

## 2023-02-02 ENCOUNTER — OFFICE VISIT (OUTPATIENT)
Dept: RHEUMATOLOGY | Age: 58
End: 2023-02-02
Payer: COMMERCIAL

## 2023-02-02 VITALS
HEART RATE: 53 BPM | DIASTOLIC BLOOD PRESSURE: 65 MMHG | OXYGEN SATURATION: 99 % | BODY MASS INDEX: 20.8 KG/M2 | SYSTOLIC BLOOD PRESSURE: 100 MMHG | WEIGHT: 125 LBS

## 2023-02-02 DIAGNOSIS — D72.819 LEUKOPENIA, UNSPECIFIED TYPE: Primary | ICD-10-CM

## 2023-02-02 DIAGNOSIS — Z01.89 ENCOUNTER FOR OTHER SPECIFIED SPECIAL EXAMINATIONS: ICD-10-CM

## 2023-02-02 PROCEDURE — 99203 OFFICE O/P NEW LOW 30 MIN: CPT | Performed by: STUDENT IN AN ORGANIZED HEALTH CARE EDUCATION/TRAINING PROGRAM

## 2023-02-02 NOTE — PROGRESS NOTES
RHEUMATOLOGY NEW PATIENT VISIT    2023      Patient Name: Kaya eMsa  : 1965  Medical Record: [id number]      CHIEF COMPLAINT    Evaluation for Lupus   Chronic low back pain     Pertinent Problems  Hypothyroidism     HISTORY OF PRESENT ILLNESS    Kaya Mesa is a 61 y.o. female who was referred by Armida Beckman for above complaint. 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 She was treated with a prolonged course of steroids. Afterwards she experienced joint pain and swelling. She was seen by a rheumatologist in [facility] that performed blood work that indicated lupus. Started on HCQ that caused a rash so was dc'd. Since then she has had 2 flare ups that she can recall in her joints that responded  to steroids. Her symptoms have been so infrequent. Joints pain none  Hair loss: +   Oral Ulcers: +   Dry eyes and mouth: Dry eyes+  Rashes: Denies  Photosensitivity: Denies   Photophobia: Denies  Joint Pains: Denies  Raynaud's: +  Chest Pain: Denies  SOB: Denies  Miscarriages: Denies  Complications during pregnancy: Denies  Blood Clots: Denies  Seizures/strokes: Denies  Kidney Disease: Acute on chronic kidney disease: denies  Anemia/thrombocytopenia/leukopenia: Leukocytopenia     Current rheum meds: none    Past rheum meds:     No flowsheet data found.         REVIEW OF SYSTEMS     Constitutional:  Denies fever or chills, decreased appetite, or weight loss   Eyes:  Dry eyes+  HENT:  Denies dry mouth or oral ulcers  Respiratory:  Denies cough or shortness of breath   Cardiovascular:  Denies chest pain or edema   GI:  Denies abdominal pain, nausea, vomiting, bloody stools or diarrhea   :  Denies dysuria or hematuria  Musculoskeletal:  See HPI  Integument:  Denies rash   Neurologic:  Denies headache, focal weakness or sensory changes   Endocrine:  Denies polyuria or polydipsia   Lymphatic:  Denies swollen glands   Psychiatric:  Denies depression or anxiety       PROBLEM LIST    Patient Active Problem List   Diagnosis    Anxiety    Hypothyroidism    Systemic lupus (HCC)    Nevus of multiple sites    Cherry angioma    Dermatofibroma    Milia    Cervical high risk human papillomavirus (HPV) DNA test positive    Papanicolaou smear of cervix with atypical squamous cells of undetermined significance (ASC-US)       MEDICATIONS    Current Outpatient Medications   Medication Sig Dispense Refill    levothyroxine (SYNTHROID) 50 MCG tablet TAKE 1 TABLET BY MOUTH EVERY DAY 30 tablet 11    meloxicam (MOBIC) 15 MG tablet TAKE 1 TABLET BY MOUTH EVERY DAY AS NEEDED FOR PAIN 90 tablet 0     No current facility-administered medications for this visit. ALLERGIES    Allergies   Allergen Reactions    Morphine Other (See Comments)     Chest pain    Nsaids Nausea Only    Plaquenil [Hydroxychloroquine Sulfate]     Sulfa Antibiotics Rash    Asa [Aspirin]      GI pain       PAST MEDICAL HISTORY      Past Medical History:   Diagnosis Date    Allergic rhinitis     Cervical herniated disc     7/14 MRI C3-4 herniation    Elevated SGOT (AST)     Fracture of sacrum (HCC)     Gastrointestinal bleeding     Herniated lumbar intervertebral disc     HGSIL (high grade squamous intraepithelial lesion) on Pap smear of cervix     Hypothyroidism     LGSIL on Pap smear of cervix     Osteoarthritis     Osteopenia     Osteoporosis     Polyp of gallbladder     Systemic lupus (HCC)          SOCIAL HISTORY     Social History     Socioeconomic History    Marital status:    Tobacco Use    Smoking status: Never    Smokeless tobacco: Never   Vaping Use    Vaping Use: Never used   Substance and Sexual Activity    Alcohol use:  Yes     Alcohol/week: 5.0 standard drinks     Types: 5 Glasses of wine per week     Comment: 1 5x per week    Drug use: No    Sexual activity: Yes     Partners: Male   Social History Narrative RN        Diet:Lowfat    Exercise:3 x per week    Seatbelts always     Social Determinants of Health     Financial Resource Strain: Low Risk     Difficulty of Paying Living Expenses: Not hard at all   Food Insecurity: No Food Insecurity    Worried About [address] in the Last Year: Never true    Ran Out of Food in the Last Year: Never true         FAMILY HISTORY     Family History   Problem Relation Age of Onset    Heart Disease Father     High Blood Pressure Father     High Cholesterol Father          PHYSICAL EXAM     Wt Readings from Last 3 Encounters:   02/02/23 125 lb (56.7 kg)   01/19/23 125 lb (56.7 kg)   12/16/22 123 lb (55.8 kg)     Temp Readings from Last 3 Encounters:   02/28/20 98.4 °F (36.9 °C)   01/20/19 97.8 °F (36.6 °C) (Oral)   04/07/15 98.1 °F (36.7 °C) (Oral)     BP Readings from Last 3 Encounters:   02/02/23 100/65   01/19/23 117/77   12/16/22 104/68     Pulse Readings from Last 3 Encounters:   02/02/23 53   12/16/22 52   09/21/22 50       General appearance:  Alert and oriented, NAD, well developed   HEENT: EOMI, no scleral injection, moist mucous membranes, no oral ulcers, normal hearing, no cartilage inflammation  Neck: Trachea midline, no masses  Lymph: no LAD  Lungs: CTAB, no rales  Heart: regular rate and rhythm, S1, S2 normal, no murmur, no lower extremity edema  Abdomen: Soft, ND, NT. + BS. Extremities: atraumatic, no cyanosis or edema. Neurologic: CN 2-12 grossly intact. Skin: No active rashes, warm and dry, no telangiectasias, no digital pitting, no sclerodactyly, no rheumatoid nodules, no livedo  MSK:   HANDS: no synovitis, good ROM,   Elbow: No synovitis, good ROM,   Shoulder:good ROM,   Knee: no effusion, good ROM,   Ankle:good ROM,   FEET: neg forefoot squeeze test    Spine:  Normal range of motion; no tender points, no obvious deformities. Neuro:  Alert & oriented x 3, normal motor function, normal sensory function, no focal deficits noted .   Muscle strength: 4/4 in bilateral upper and lower extremities. Psychiatric: Mood and affect are appropriate, recent and remote memory normal,    LABS AND IMAGING    Available labs were reviewed and discussed with patient     9/2022  HINA neg   RF neg   WBC 3.9 L   Hgb wnl  Plt wnl  ESR wnl  CRP neg     Assessment   Patient is a pleasant 62 yo f with hx of lupus diagnosed in 2007 based on abnormal serologies of which details are unclear. HINA since 2014 have been negative. Based on HPI and labs findings, I am uncertain about lupus diagnoses at this time, her symptoms appear stable despite never being treated for years. Will order lupus comprehensive panel. 1. Leukopenia, unspecified type  - lupus comprehensive panel by exagen   - Protein / creatinine ratio, urine; Future  - Urinalysis with Microscopic; Future  - Quantiferon, Incubated; Future  - Hepatitis Panel, Acute; Future  - Cyclic Citrul Peptide Antibody, IgG; Future  - Rheumatoid Factor; Future  - C4 Complement; Future  - C3 Complement; Future  - C-Reactive Protein; Future  - Sedimentation Rate; Future  - Cardiolipin Antibodies IgG & IgM; Future  - Lupus Anticoagulant; Future  - Beta-2 Glycoprotein Antibodies; Future  - Hepatic Function Panel; Future  - Renal Function Panel; Future  - CBC; Future    2. Encounter for other specified special examinations  - Hepatitis Panel, Acute; Future     Patient Instructions  Complete ordered labs   We will discuss results at next visit  RTC in 4 weeks       -  The patient indicates understanding of these issues and agrees with the plan.     I spent  26 minutes on the date of service, preparing to see the patient (eg, review of tests), obtaining and/or reviewing separately obtained history, counseling and educating the family/caregiver, ordering medications, tests, or procedures and documenting clinical information in the electronic or other health record, care coordination (not separately reported)      Nara Lewis MD

## 2023-02-20 DIAGNOSIS — R87.810 CERVICAL HIGH RISK HUMAN PAPILLOMAVIRUS (HPV) DNA TEST POSITIVE: ICD-10-CM

## 2023-02-20 DIAGNOSIS — Z12.31 SCREENING MAMMOGRAM FOR BREAST CANCER: ICD-10-CM

## 2023-02-20 DIAGNOSIS — Z13.820 ENCOUNTER FOR OSTEOPOROSIS SCREENING IN ASYMPTOMATIC POSTMENOPAUSAL PATIENT: ICD-10-CM

## 2023-02-20 DIAGNOSIS — Z78.0 ENCOUNTER FOR OSTEOPOROSIS SCREENING IN ASYMPTOMATIC POSTMENOPAUSAL PATIENT: ICD-10-CM

## 2023-02-20 DIAGNOSIS — R87.610 PAPANICOLAOU SMEAR OF CERVIX WITH ATYPICAL SQUAMOUS CELLS OF UNDETERMINED SIGNIFICANCE (ASC-US): ICD-10-CM

## 2023-02-21 DIAGNOSIS — M81.0 OSTEOPOROSIS, POST-MENOPAUSAL: Primary | ICD-10-CM

## 2023-02-21 PROCEDURE — 36415 COLL VENOUS BLD VENIPUNCTURE: CPT | Performed by: OBSTETRICS & GYNECOLOGY

## 2023-02-21 RX ORDER — IBANDRONATE SODIUM 150 MG/1
150 TABLET, FILM COATED ORAL
Qty: 3 TABLET | Refills: 3 | Status: SHIPPED | OUTPATIENT
Start: 2023-02-21

## 2023-03-03 ENCOUNTER — TELEPHONE (OUTPATIENT)
Dept: RHEUMATOLOGY | Age: 58
End: 2023-03-03

## 2023-03-03 ENCOUNTER — HOSPITAL ENCOUNTER (OUTPATIENT)
Age: 58
Discharge: HOME | End: 2023-03-03
Payer: COMMERCIAL

## 2023-03-03 DIAGNOSIS — D72.819 LEUKOPENIA, UNSPECIFIED TYPE: ICD-10-CM

## 2023-03-03 DIAGNOSIS — Z01.89 ENCOUNTER FOR OTHER SPECIFIED SPECIAL EXAMINATIONS: ICD-10-CM

## 2023-03-03 LAB
ALBUMIN SERPL-MCNC: 4.6 GM/DL (ref 3.4–5)
ALBUMIN SERPL-MCNC: 4.6 GM/DL (ref 3.4–5)
ALP BLD-CCNC: 68 IU/L (ref 40–129)
ALT SERPL-CCNC: 18 U/L (ref 10–40)
ANION GAP SERPL CALCULATED.3IONS-SCNC: 12 MMOL/L (ref 4–16)
AST SERPL-CCNC: 23 IU/L (ref 15–37)
BILIRUB SERPL-MCNC: 0.5 MG/DL (ref 0–1)
BILIRUBIN DIRECT: 0.2 MG/DL (ref 0–0.3)
BILIRUBIN, INDIRECT: 0.3 MG/DL (ref 0–0.7)
BUN SERPL-MCNC: 15 MG/DL (ref 6–23)
CALCIUM SERPL-MCNC: 9.3 MG/DL (ref 8.3–10.6)
CHLORIDE BLD-SCNC: 99 MMOL/L (ref 99–110)
CO2: 27 MMOL/L (ref 21–32)
CREAT SERPL-MCNC: 0.7 MG/DL (ref 0.6–1.1)
CREATININE URINE: 34.5 MG/DL (ref 28–217)
CRP SERPL HS-MCNC: 3 MG/L
ERYTHROCYTE SEDIMENTATION RATE: 1 MM/HR (ref 0–30)
GFR SERPL CREATININE-BSD FRML MDRD: >60 ML/MIN/1.73M2
GLUCOSE SERPL-MCNC: 78 MG/DL (ref 70–99)
HAV IGM SERPL QL IA: NON REACTIVE
HBV CORE IGM SERPL QL IA: NON REACTIVE
HBV SURFACE AG SERPL QL IA: NON REACTIVE
HCT VFR BLD CALC: 41.3 % (ref 37–47)
HCV AB SERPL QL IA: NON REACTIVE
HEMOGLOBIN: 13.1 GM/DL (ref 12.5–16)
MCH RBC QN AUTO: 30.2 PG (ref 27–31)
MCHC RBC AUTO-ENTMCNC: 31.7 % (ref 32–36)
MCV RBC AUTO: 95.2 FL (ref 78–100)
PDW BLD-RTO: 12 % (ref 11.7–14.9)
PHOSPHORUS: 4.5 MG/DL (ref 2.5–4.9)
PLATELET # BLD: 224 K/CU MM (ref 140–440)
PMV BLD AUTO: 11 FL (ref 7.5–11.1)
POTASSIUM SERPL-SCNC: 3.9 MMOL/L (ref 3.5–5.1)
PROT/CREAT RATIO, UR: 0.1
RBC # BLD: 4.34 M/CU MM (ref 4.2–5.4)
SODIUM BLD-SCNC: 138 MMOL/L (ref 135–145)
TOTAL PROTEIN: 6.5 GM/DL (ref 6.4–8.2)
URINE TOTAL PROTEIN: 4 MG/DL
WBC # BLD: 4.2 K/CU MM (ref 4–10.5)

## 2023-03-03 PROCEDURE — 84156 ASSAY OF PROTEIN URINE: CPT

## 2023-03-03 PROCEDURE — 36415 COLL VENOUS BLD VENIPUNCTURE: CPT

## 2023-03-03 PROCEDURE — 85652 RBC SED RATE AUTOMATED: CPT

## 2023-03-03 PROCEDURE — 86146 BETA-2 GLYCOPROTEIN ANTIBODY: CPT

## 2023-03-03 PROCEDURE — 82248 BILIRUBIN DIRECT: CPT

## 2023-03-03 PROCEDURE — 84100 ASSAY OF PHOSPHORUS: CPT

## 2023-03-03 PROCEDURE — 80074 ACUTE HEPATITIS PANEL: CPT

## 2023-03-03 PROCEDURE — 82570 ASSAY OF URINE CREATININE: CPT

## 2023-03-03 PROCEDURE — 86160 COMPLEMENT ANTIGEN: CPT

## 2023-03-03 PROCEDURE — 86430 RHEUMATOID FACTOR TEST QUAL: CPT

## 2023-03-03 PROCEDURE — 85027 COMPLETE CBC AUTOMATED: CPT

## 2023-03-03 PROCEDURE — 85613 RUSSELL VIPER VENOM DILUTED: CPT

## 2023-03-03 PROCEDURE — 86200 CCP ANTIBODY: CPT

## 2023-03-03 PROCEDURE — 80053 COMPREHEN METABOLIC PANEL: CPT

## 2023-03-03 PROCEDURE — 86140 C-REACTIVE PROTEIN: CPT

## 2023-03-03 PROCEDURE — 86147 CARDIOLIPIN ANTIBODY EA IG: CPT

## 2023-03-05 LAB — RHEUMATOID FACTOR: <10 IU/ML (ref 0–14)

## 2023-03-14 ENCOUNTER — OFFICE VISIT (OUTPATIENT)
Dept: OBGYN | Age: 58
End: 2023-03-14
Payer: COMMERCIAL

## 2023-03-14 VITALS
WEIGHT: 125 LBS | SYSTOLIC BLOOD PRESSURE: 124 MMHG | BODY MASS INDEX: 20.83 KG/M2 | DIASTOLIC BLOOD PRESSURE: 83 MMHG | HEIGHT: 65 IN

## 2023-03-14 DIAGNOSIS — M81.0 OSTEOPOROSIS, POST-MENOPAUSAL: Primary | ICD-10-CM

## 2023-03-14 LAB — 25(OH)D3 SERPL-MCNC: 60.8 NG/ML

## 2023-03-14 PROCEDURE — 36415 COLL VENOUS BLD VENIPUNCTURE: CPT | Performed by: OBSTETRICS & GYNECOLOGY

## 2023-03-14 PROCEDURE — 99213 OFFICE O/P EST LOW 20 MIN: CPT | Performed by: OBSTETRICS & GYNECOLOGY

## 2023-03-14 RX ORDER — CALCIUM CARBONATE 300MG(750)
1 TABLET,CHEWABLE ORAL DAILY
Qty: 90 TABLET | Refills: 3 | Status: SHIPPED | OUTPATIENT
Start: 2023-03-14

## 2023-03-14 SDOH — ECONOMIC STABILITY: FOOD INSECURITY: WITHIN THE PAST 12 MONTHS, YOU WORRIED THAT YOUR FOOD WOULD RUN OUT BEFORE YOU GOT MONEY TO BUY MORE.: NEVER TRUE

## 2023-03-14 SDOH — ECONOMIC STABILITY: INCOME INSECURITY: HOW HARD IS IT FOR YOU TO PAY FOR THE VERY BASICS LIKE FOOD, HOUSING, MEDICAL CARE, AND HEATING?: NOT HARD AT ALL

## 2023-03-14 SDOH — ECONOMIC STABILITY: FOOD INSECURITY: WITHIN THE PAST 12 MONTHS, THE FOOD YOU BOUGHT JUST DIDN'T LAST AND YOU DIDN'T HAVE MONEY TO GET MORE.: NEVER TRUE

## 2023-03-14 SDOH — ECONOMIC STABILITY: HOUSING INSECURITY
IN THE LAST 12 MONTHS, WAS THERE A TIME WHEN YOU DID NOT HAVE A STEADY PLACE TO SLEEP OR SLEPT IN A SHELTER (INCLUDING NOW)?: NO

## 2023-03-14 ASSESSMENT — ENCOUNTER SYMPTOMS
EYES NEGATIVE: 1
RESPIRATORY NEGATIVE: 1
ALLERGIC/IMMUNOLOGIC NEGATIVE: 1
GASTROINTESTINAL NEGATIVE: 1

## 2023-03-14 NOTE — PROGRESS NOTES
3/14/23    Gin Jiang  1965    Chief Complaint   Patient presents with    Osteoporosis     Pt here to discuss dexa results from 2/20/2023, osteoporosis of the lumbar spine. Has not had labs. Pt is taking CA 650mg, Vit D 500iu and K 40mg daily. Epifanio Sanchez is a 61 y.o. female who presents today for evaluation of osteoporosis. Past Medical History:   Diagnosis Date    Allergic rhinitis     Cervical herniated disc     7/14 MRI C3-4 herniation    Elevated SGOT (AST)     Fracture of sacrum (HCC)     Gastrointestinal bleeding     Herniated lumbar intervertebral disc     HGSIL (high grade squamous intraepithelial lesion) on Pap smear of cervix     Hypothyroidism     LGSIL on Pap smear of cervix     Osteoarthritis     Osteopenia     Osteoporosis     Polyp of gallbladder     Systemic lupus [facility])        Past Surgical History:   Procedure Laterality Date    BREAST REDUCTION SURGERY  01/01/1988    ENDOMETRIAL ABLATION      ESOPHAGOGASTRODUODENOSCOPY      FINGER SURGERY      1990    LEEP      LUNG BIOPSY         Social History     Tobacco Use    Smoking status: Never    Smokeless tobacco: Never   Vaping Use    Vaping Use: Never used   Substance Use Topics    Alcohol use: Yes     Alcohol/week: 5.0 standard drinks     Types: 5 Glasses of wine per week     Comment: 1 5x per week    Drug use: No       Family History   Problem Relation Age of Onset    Heart Disease Father     High Blood Pressure Father     High Cholesterol Father        Current Outpatient Medications   Medication Sig Dispense Refill    Cholecalciferol (VITAMIN D3) 25 MCG (1000 UT) CHEW Take 1 tablet by mouth daily 90 tablet 3    ibandronate (BONIVA) 150 MG tablet Take 1 tablet by mouth every 30 days Take one (1) tablet once per month in the morning with a full glass of water, on an empty stomach, and do not take anything else by mouth or lie down for the next 60 minutes.  3 tablet 3    levothyroxine (SYNTHROID) 50 MCG tablet TAKE 1 TABLET BY MOUTH EVERY DAY 30 tablet 11    meloxicam (MOBIC) 15 MG tablet TAKE 1 TABLET BY MOUTH EVERY DAY AS NEEDED FOR PAIN 90 tablet 0     No current facility-administered medications for this visit. Allergies   Allergen Reactions    Morphine Other (See Comments)     Chest pain    Nsaids Nausea Only    Plaquenil [Hydroxychloroquine Sulfate]     Sulfa Antibiotics Rash    Asa [Aspirin]      GI pain           Immunization History   Administered Date(s) Administered    COVID-19, J&J, (age 18y+), IM, 0.5 mL 2021    Influenza, High Dose (Fluzone 65 yrs and older) 10/26/2016    Tdap (Boostrix, Adacel) 10/19/2020       Review of Systems   Constitutional: Negative. Eyes: Negative. Respiratory: Negative. Cardiovascular: Negative. Gastrointestinal: Negative. Endocrine: Negative. Genitourinary: Negative. Musculoskeletal: Negative. Skin: Negative. Allergic/Immunologic: Negative. Neurological: Negative. Hematological: Negative. Psychiatric/Behavioral: Negative. /83 (Site: Right Upper Arm, Position: Sitting, Cuff Size: Large Adult)   Ht 5' 5\" (1.651 m)   Wt 125 lb (56.7 kg)   LMP  (LMP Unknown)   BMI 20.80 kg/m²     Physical Exam    No results found for this visit on 23. see  ASSESSMENT AND PLAN   Diagnosis Orders   1. Osteoporosis, post-menopausal  Vitamin D 25 Hydroxy        Weightbearing exercise, calcium, vitamin d3. Declines medications    Repeat dexa 1 year    No follow-ups on file.     Laura Juarez MD

## 2023-03-26 NOTE — PROGRESS NOTES
RHEUMATOLOGY FOLLOW-UP VISIT    3/28/2023      Patient Name: Tanisha Lundberg  : 1965  Medical Record: [id number]      CHIEF COMPLAINT    Evaluation for lupus  Chronic low back pain  Dry eyes    Pertinent Problems  Hypothyroidism     HISTORY OF PRESENT ILLNESS    Tanisha Lundberg is a 61 y.o. female who established on 2023. 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 She was treated with a prolonged course of steroids. Afterwards she experienced joint pain and swelling. She was seen by a rheumatologist in [facility] that performed blood work that indicated lupus. Started on HCQ that caused a rash so was dc'd. Since then she has had 2 flare ups that she can recall in her joints that responded  to steroids. Her symptoms have been so infrequent. Joints pain none  Hair loss: +   Oral Ulcers: +   Dry eyes and mouth: Dry eyes+  Rashes: Denies  Photosensitivity: Denies   Raynaud's: +  Miscarriages: Denies  Blood Clots: Denies  Kidney Disease: Acute on chronic kidney disease: denies  Anemia/thrombocytopenia/leukopenia: Leukocytopenia     LCV: 2023    Subjective:  Patient is here to discuss her results  Labs do not suggest lupus  HINA, SSA, SSB neg     Subjective:   No new complaints since last encounter  She is here to discuss her labs   No joint pain   Dry eyes+  Denies dry mouth     Current rheum meds: none    Past rheum meds:     No flowsheet data found.         REVIEW OF SYSTEMS     Constitutional:  Denies fever or chills, decreased appetite, or weight loss   Eyes:  Dry eyes+  HENT:  Denies dry mouth or oral ulcers  Respiratory:  Denies cough or shortness of breath   Cardiovascular:  Denies chest pain or edema   GI:  Denies abdominal pain, nausea, vomiting, bloody stools or diarrhea   :  Denies dysuria or hematuria  Musculoskeletal:  See HPI  Integument:  Denies rash   Neurologic:

## 2023-03-28 ENCOUNTER — OFFICE VISIT (OUTPATIENT)
Dept: RHEUMATOLOGY | Age: 58
End: 2023-03-28
Payer: COMMERCIAL

## 2023-03-28 VITALS
DIASTOLIC BLOOD PRESSURE: 60 MMHG | HEART RATE: 59 BPM | SYSTOLIC BLOOD PRESSURE: 100 MMHG | WEIGHT: 126 LBS | OXYGEN SATURATION: 99 % | BODY MASS INDEX: 20.97 KG/M2

## 2023-03-28 DIAGNOSIS — M35.00 SICCA, UNSPECIFIED TYPE (HCC): Primary | ICD-10-CM

## 2023-03-28 PROCEDURE — 99214 OFFICE O/P EST MOD 30 MIN: CPT | Performed by: STUDENT IN AN ORGANIZED HEALTH CARE EDUCATION/TRAINING PROGRAM

## 2023-03-28 NOTE — PATIENT INSTRUCTIONS
At this time your lupus and Sjogrens work up was negative   Let us know if you experience dry mouth   RTC as needed if new issues arise.

## 2023-07-07 ENCOUNTER — OFFICE VISIT (OUTPATIENT)
Dept: INTERNAL MEDICINE CLINIC | Age: 58
End: 2023-07-07
Payer: COMMERCIAL

## 2023-07-07 VITALS
HEART RATE: 55 BPM | OXYGEN SATURATION: 100 % | RESPIRATION RATE: 18 BRPM | DIASTOLIC BLOOD PRESSURE: 68 MMHG | SYSTOLIC BLOOD PRESSURE: 102 MMHG

## 2023-07-07 DIAGNOSIS — J02.0 STREP THROAT: Primary | ICD-10-CM

## 2023-07-07 PROCEDURE — 99213 OFFICE O/P EST LOW 20 MIN: CPT | Performed by: INTERNAL MEDICINE

## 2023-07-07 RX ORDER — AZITHROMYCIN 250 MG/1
250 TABLET, FILM COATED ORAL SEE ADMIN INSTRUCTIONS
Qty: 6 TABLET | Refills: 0 | Status: SHIPPED | OUTPATIENT
Start: 2023-07-07 | End: 2023-07-12

## 2023-07-07 NOTE — PROGRESS NOTES
Jayde [facility]  1965 07/07/23    SUBJECTIVE:      Pt complains that Wednesday she woke with sore throat. Thursday this worsened, and she developed chills. She noticed erythema and white exudate. She has left sided tender LAD. She denies cough, rhinorrhea, nasal congestion,     OBJECTIVE:    /68   Pulse 55   Resp 18   LMP  (LMP Unknown)   SpO2 100%     Physical Exam  White tnsillar exudate and erythema; (+) left cervical LAD    CTA RRR    ASSESSMENT:    1. Strep throat        PLAN:    Adela Horton was seen today for sore throat. Diagnoses and all orders for this visit:    Strep throat - clinically seems to be strep. Tx with azith  -     azithromycin (ZITHROMAX) 250 MG tablet;  Take 1 tablet by mouth See Admin Instructions for 5 days 500mg on day 1 followed by 250mg on days 2 - 5

## 2023-07-20 ENCOUNTER — OFFICE VISIT (OUTPATIENT)
Dept: OBGYN | Age: 58
End: 2023-07-20
Payer: COMMERCIAL

## 2023-07-20 ENCOUNTER — HOSPITAL ENCOUNTER (OUTPATIENT)
Age: 58
Setting detail: SPECIMEN
Discharge: HOME | End: 2023-07-20
Payer: COMMERCIAL

## 2023-07-20 VITALS
HEIGHT: 65 IN | WEIGHT: 120 LBS | SYSTOLIC BLOOD PRESSURE: 140 MMHG | DIASTOLIC BLOOD PRESSURE: 67 MMHG | BODY MASS INDEX: 19.99 KG/M2

## 2023-07-20 DIAGNOSIS — R87.610 PAPANICOLAOU SMEAR OF CERVIX WITH ATYPICAL SQUAMOUS CELLS OF UNDETERMINED SIGNIFICANCE (ASC-US): ICD-10-CM

## 2023-07-20 DIAGNOSIS — R87.810 CERVICAL HIGH RISK HUMAN PAPILLOMAVIRUS (HPV) DNA TEST POSITIVE: Primary | ICD-10-CM

## 2023-07-20 PROCEDURE — 87624 HPV HI-RISK TYP POOLED RSLT: CPT

## 2023-07-20 PROCEDURE — 99213 OFFICE O/P EST LOW 20 MIN: CPT | Performed by: OBSTETRICS & GYNECOLOGY

## 2023-07-20 NOTE — PROGRESS NOTES
7/20/23    Gin Sigrid Baker  1965    Chief Complaint   Patient presents with    Abnormal Pap Smear     Pt here for repeat pap smear. Pt last pap smear was 1/19/2023 negative, pap smear 07/28/2022 ECC-negative with Predominantly Benign Squamous epithelial cells. 7/19/22 showed ascus +hpv, pap 1/17/22 ascus +hpv         Stephanie Crews is a 61 y.o. female who presents today for evaluation of abnormal pap smears    Past Medical History:   Diagnosis Date    Allergic rhinitis     Cervical herniated disc     7/14 MRI C3-4 herniation    Elevated SGOT (AST)     Fracture of sacrum (HCC)     Gastrointestinal bleeding     Herniated lumbar intervertebral disc     HGSIL (high grade squamous intraepithelial lesion) on Pap smear of cervix     Hypothyroidism     LGSIL on Pap smear of cervix     Osteoarthritis     Osteopenia     Osteoporosis     Polyp of gallbladder     Systemic lupus (HCC)        Past Surgical History:   Procedure Laterality Date    BREAST REDUCTION SURGERY  01/01/1988    ENDOMETRIAL ABLATION      ESOPHAGOGASTRODUODENOSCOPY      FINGER SURGERY      1990    LEEP      LUNG BIOPSY         Social History     Tobacco Use    Smoking status: Never    Smokeless tobacco: Never   Vaping Use    Vaping Use: Never used   Substance Use Topics    Alcohol use:  Yes     Alcohol/week: 5.0 standard drinks     Types: 5 Glasses of wine per week     Comment: 1 5x per week    Drug use: No       Family History   Problem Relation Age of Onset    Heart Disease Father     High Blood Pressure Father     High Cholesterol Father        Current Outpatient Medications   Medication Sig Dispense Refill    Multiple Vitamin (MULTIVITAMIN ADULT PO) Take 1 tablet by mouth daily      Calcium Carb-Cholecalciferol (CALCIUM 600-D PO) Take 2 tablets by mouth daily      Cholecalciferol (VITAMIN D3) 25 MCG (1000 UT) CHEW Take 1 tablet by mouth daily 90 tablet 3    ibandronate (BONIVA) 150 MG tablet Take 1 tablet by mouth every 30 days Take one

## 2023-07-21 DIAGNOSIS — Z00.00 ENCOUNTER FOR PREVENTIVE CARE: ICD-10-CM

## 2023-07-21 DIAGNOSIS — M32.13 SYSTEMIC LUPUS ERYTHEMATOSUS WITH LUNG INVOLVEMENT, UNSPECIFIED SLE TYPE (HCC): ICD-10-CM

## 2023-07-24 RX ORDER — MELOXICAM 15 MG/1
TABLET ORAL
Qty: 90 TABLET | Refills: 0 | Status: SHIPPED | OUTPATIENT
Start: 2023-07-24

## 2023-07-27 LAB
HPV HIGH RISK: NOT DETECTED
HPV, GENOTYPE 16: NOT DETECTED
HPV, GENOTYPE 18: NOT DETECTED

## 2023-08-23 ENCOUNTER — OFFICE VISIT (OUTPATIENT)
Dept: OBGYN | Age: 58
End: 2023-08-23
Payer: COMMERCIAL

## 2023-08-23 VITALS
DIASTOLIC BLOOD PRESSURE: 72 MMHG | HEIGHT: 65 IN | SYSTOLIC BLOOD PRESSURE: 108 MMHG | WEIGHT: 121 LBS | BODY MASS INDEX: 20.16 KG/M2

## 2023-08-23 DIAGNOSIS — N89.8 VAGINAL DISCHARGE: ICD-10-CM

## 2023-08-23 DIAGNOSIS — N90.89 VULVAR IRRITATION: Primary | ICD-10-CM

## 2023-08-23 DIAGNOSIS — R35.0 FREQUENT URINATION: ICD-10-CM

## 2023-08-23 LAB
BILIRUBIN, POC: NORMAL
BLOOD URINE, POC: NORMAL
CLARITY, POC: NORMAL
COLOR, POC: NORMAL
GLUCOSE URINE, POC: NORMAL
KETONES, POC: NORMAL
LEUKOCYTE EST, POC: NORMAL
NITRITE, POC: NORMAL
PH, POC: 5.5
PROTEIN, POC: NORMAL
SPECIFIC GRAVITY, POC: 1.02
UROBILINOGEN, POC: NORMAL

## 2023-08-23 PROCEDURE — 81002 URINALYSIS NONAUTO W/O SCOPE: CPT

## 2023-08-23 PROCEDURE — 99213 OFFICE O/P EST LOW 20 MIN: CPT

## 2023-08-23 ASSESSMENT — ENCOUNTER SYMPTOMS
RESPIRATORY NEGATIVE: 1
ABDOMINAL PAIN: 0
GASTROINTESTINAL NEGATIVE: 1

## 2023-08-23 NOTE — PROGRESS NOTES
8/23/23    Gin Gil  1965    Chief Complaint   Patient presents with    Vaginal Discharge     Pt c/o heavier than normal discharge with itching, redness and swollen left labia x1wk . Also c/o frequent urination. Denies odor        Yazan Otto is a 61 y.o. female who presents today for evaluation of vaginal discharge and irritation, also R labial redness and irritation x few days. Denies dysuria, reports slightly increased urinary frequency. She states labial redness and irritation has improved since she first noticed it. Past Medical History:   Diagnosis Date    Allergic rhinitis     Cervical herniated disc     7/14 MRI C3-4 herniation    Elevated SGOT (AST)     Fracture of sacrum (HCC)     Gastrointestinal bleeding     Herniated lumbar intervertebral disc     HGSIL (high grade squamous intraepithelial lesion) on Pap smear of cervix     Hypothyroidism     LGSIL on Pap smear of cervix     Osteoarthritis     Osteopenia     Osteoporosis     Polyp of gallbladder     Systemic lupus [facility])        Past Surgical History:   Procedure Laterality Date    BREAST REDUCTION SURGERY  01/01/1988    ENDOMETRIAL ABLATION      ESOPHAGOGASTRODUODENOSCOPY      FINGER SURGERY      1990    LEEP      LUNG BIOPSY         Social History     Tobacco Use    Smoking status: Never    Smokeless tobacco: Never   Vaping Use    Vaping Use: Never used   Substance Use Topics    Alcohol use:  Yes     Alcohol/week: 5.0 standard drinks     Types: 5 Glasses of wine per week     Comment: 1 5x per week    Drug use: No       Family History   Problem Relation Age of Onset    Heart Disease Father     High Blood Pressure Father     High Cholesterol Father        Current Outpatient Medications   Medication Sig Dispense Refill    meloxicam (MOBIC) 15 MG tablet TAKE 1 TABLET BY MOUTH EVERY DAY AS NEEDED FOR PAIN 90 tablet 0    Multiple Vitamin (MULTIVITAMIN ADULT PO) Take 1 tablet by mouth daily      Calcium Carb-Cholecalciferol (CALCIUM

## 2023-08-24 DIAGNOSIS — B96.89 BACTERIAL VAGINOSIS: Primary | ICD-10-CM

## 2023-08-24 DIAGNOSIS — N76.0 BACTERIAL VAGINOSIS: Primary | ICD-10-CM

## 2023-08-24 LAB
BACTERIA UR CULT: NORMAL
CANDIDA DNA VAG QL NAA+PROBE: ABNORMAL
G VAGINALIS DNA SPEC QL NAA+PROBE: ABNORMAL
T VAGINALIS DNA VAG QL NAA+PROBE: ABNORMAL

## 2023-08-24 RX ORDER — METRONIDAZOLE 500 MG/1
500 TABLET ORAL 2 TIMES DAILY
Qty: 14 TABLET | Refills: 0 | Status: SHIPPED | OUTPATIENT
Start: 2023-08-24 | End: 2023-08-31

## 2023-09-25 ENCOUNTER — OFFICE VISIT (OUTPATIENT)
Dept: INTERNAL MEDICINE CLINIC | Age: 58
End: 2023-09-25
Payer: COMMERCIAL

## 2023-09-25 VITALS
SYSTOLIC BLOOD PRESSURE: 98 MMHG | HEART RATE: 54 BPM | OXYGEN SATURATION: 99 % | RESPIRATION RATE: 12 BRPM | DIASTOLIC BLOOD PRESSURE: 62 MMHG

## 2023-09-25 DIAGNOSIS — Z00.00 ENCOUNTER FOR PREVENTIVE CARE: Primary | ICD-10-CM

## 2023-09-25 DIAGNOSIS — E03.4 HYPOTHYROIDISM DUE TO ACQUIRED ATROPHY OF THYROID: ICD-10-CM

## 2023-09-25 PROCEDURE — 99396 PREV VISIT EST AGE 40-64: CPT | Performed by: INTERNAL MEDICINE

## 2023-09-25 RX ORDER — LEVOTHYROXINE SODIUM 0.05 MG/1
50 TABLET ORAL DAILY
Qty: 90 TABLET | Refills: 3 | Status: SHIPPED | OUTPATIENT
Start: 2023-09-25

## 2023-09-25 NOTE — PROGRESS NOTES
Gin Mason Nam  1965 09/25/23    SUBJECTIVE:    Pt continues on synthroid for hypothyroidism. She has been taking the     She has been lifting weights, taking calcium and vitamin D. She has not been on boniva. OBJECTIVE:    BP 98/62   Pulse 54   Resp 12   LMP  (LMP Unknown)   SpO2 99%     Physical Exam  Constitutional:       Appearance: She is well-developed. Eyes:      General: No scleral icterus. Conjunctiva/sclera: Conjunctivae normal.   Neck:      Thyroid: No thyromegaly. Trachea: No tracheal deviation. Cardiovascular:      Rate and Rhythm: Normal rate and regular rhythm. Heart sounds: No murmur heard. No friction rub. No gallop. Pulmonary:      Effort: No respiratory distress. Breath sounds: No wheezing or rales. Abdominal:      General: Bowel sounds are normal. There is no distension. Palpations: Abdomen is soft. There is no hepatomegaly or mass. Tenderness: There is no abdominal tenderness. There is no guarding or rebound. Musculoskeletal:      Cervical back: Neck supple. Lymphadenopathy:      Cervical: No cervical adenopathy. Skin:     Nails: There is no clubbing. Neurological:      Mental Status: She is alert and oriented to person, place, and time. Psychiatric:         Behavior: Behavior normal.         Judgment: Judgment normal.         ASSESSMENT:    1. Encounter for preventive care    2. Hypothyroidism due to acquired atrophy of thyroid        PLAN:    German Antony was seen today for annual exam and medication refill. Diagnoses and all orders for this visit:    Encounter for preventive care - encourage exercise; BP at goal; check labs; encourage covid booster, flu shot; up to date on c-scop, GYN care, mammo, dexa, shingles vaccine  -     Comprehensive Metabolic Panel; Future  -     TSH; Future  -     Lipid Panel; Future    Hypothyroidism due to acquired atrophy of thyroid - check labs  -     levothyroxine (SYNTHROID) 50 MCG tablet;  Take

## 2023-10-07 LAB
ALBUMIN/GLOBULIN RATIO: 2.4 RATIO (ref 0.8–2.6)
ALBUMIN: 4.7 G/DL (ref 3.5–5.2)
ALP BLD-CCNC: 69 U/L (ref 23–144)
ALT SERPL-CCNC: 22 U/L (ref 0–60)
AST SERPL-CCNC: 29 U/L (ref 0–55)
BILIRUB SERPL-MCNC: 0.2 MG/DL (ref 0–1.2)
BUN BLDV-MCNC: 11 MG/DL (ref 3–29)
BUN/CREAT BLD: 14 (ref 7–25)
CALCIUM SERPL-MCNC: 9.6 MG/DL (ref 8.5–10.5)
CHLORIDE BLD-SCNC: 102 MEQ/L (ref 96–110)
CHOLESTEROL: 185 MG/DL
CO2: 29 MEQ/L (ref 19–32)
CREAT SERPL-MCNC: 0.8 MG/DL (ref 0.5–1.2)
GLOBULIN: 2 G/DL (ref 1.9–3.6)
GLOMERULAR FILTRATION RATE: 85 MLS/MIN/1.73M2
GLUCOSE BLD-MCNC: 88 MG/DL (ref 70–99)
HDLC SERPL-MCNC: 71 MG/DL
LDL CHOLESTEROL CALCULATED: 99 MG/DL
POTASSIUM SERPL-SCNC: 4 MEQ/L (ref 3.4–5.3)
SODIUM BLD-SCNC: 140 MEQ/L (ref 135–148)
STATUS: NORMAL
TOTAL PROTEIN: 6.7 G/DL (ref 6–8.3)
TRIGL SERPL-MCNC: 77 MG/DL
TSH SERPL DL<=0.05 MIU/L-ACNC: 0.54 MCIU/ML (ref 0.4–4.5)
VLDLC SERPL CALC-MCNC: 15 MG/DL (ref 4–38)

## 2023-10-24 ENCOUNTER — OFFICE VISIT (OUTPATIENT)
Dept: OBGYN | Age: 58
End: 2023-10-24
Payer: COMMERCIAL

## 2023-10-24 VITALS
WEIGHT: 124 LBS | DIASTOLIC BLOOD PRESSURE: 68 MMHG | SYSTOLIC BLOOD PRESSURE: 102 MMHG | HEIGHT: 65 IN | BODY MASS INDEX: 20.66 KG/M2

## 2023-10-24 DIAGNOSIS — N90.89 PERINEAL FISSURE IN FEMALE: ICD-10-CM

## 2023-10-24 DIAGNOSIS — K60.0 ACUTE ANAL FISSURE: ICD-10-CM

## 2023-10-24 DIAGNOSIS — N89.8 VAGINAL DISCHARGE: Primary | ICD-10-CM

## 2023-10-24 DIAGNOSIS — N89.8 VAGINAL IRRITATION: ICD-10-CM

## 2023-10-24 PROCEDURE — 99214 OFFICE O/P EST MOD 30 MIN: CPT | Performed by: NURSE PRACTITIONER

## 2023-10-24 ASSESSMENT — ENCOUNTER SYMPTOMS
NAUSEA: 0
SHORTNESS OF BREATH: 0
RESPIRATORY NEGATIVE: 1
VOMITING: 0
RECTAL PAIN: 1
DIARRHEA: 0
ABDOMINAL PAIN: 0
CONSTIPATION: 1

## 2023-10-24 NOTE — PROGRESS NOTES
10/24/23    [place] Isabella Kumari  1965    Chief Complaint   Patient presents with    Vaginal Discharge     Patient presents with vaginal discharge and vulvar irritation x5 days. Pt requesting a different antibiotic than Flagyl she has problems taking large pills. Cj Yarbrough is a 60 y.o. female who presents today for evaluation of vaginal discharge and irritation    C/o constipation/hemorrhoids    Past Medical History:   Diagnosis Date    Allergic rhinitis     Cervical herniated disc     7/14 MRI C3-4 herniation    Elevated SGOT (AST)     Fracture of sacrum (HCC)     Gastrointestinal bleeding     Herniated lumbar intervertebral disc     HGSIL (high grade squamous intraepithelial lesion) on Pap smear of cervix     Hypothyroidism     LGSIL on Pap smear of cervix     Osteoarthritis     Osteopenia     Osteoporosis     Polyp of gallbladder     Systemic lupus (HCC)        Past Surgical History:   Procedure Laterality Date    BREAST REDUCTION SURGERY  01/01/1988    ENDOMETRIAL ABLATION      ESOPHAGOGASTRODUODENOSCOPY      FINGER SURGERY      1990    LEEP      LUNG BIOPSY         Social History     Tobacco Use    Smoking status: Never    Smokeless tobacco: Never   Vaping Use    Vaping Use: Never used   Substance Use Topics    Alcohol use:  Yes     Alcohol/week: 5.0 standard drinks of alcohol     Types: 5 Glasses of wine per week     Comment: 1 5x per week    Drug use: No       Family History   Problem Relation Age of Onset    Heart Disease Father     High Blood Pressure Father     High Cholesterol Father        Current Outpatient Medications   Medication Sig Dispense Refill    levothyroxine (SYNTHROID) 50 MCG tablet Take 1 tablet by mouth daily 90 tablet 3    meloxicam (MOBIC) 15 MG tablet TAKE 1 TABLET BY MOUTH EVERY DAY AS NEEDED FOR PAIN 90 tablet 0    Multiple Vitamin (MULTIVITAMIN ADULT PO) Take 1 tablet by mouth daily      Calcium Carb-Cholecalciferol (CALCIUM 600-D PO) Take 2 tablets by mouth daily

## 2023-10-25 LAB
CANDIDA DNA VAG QL NAA+PROBE: NORMAL
G VAGINALIS DNA SPEC QL NAA+PROBE: NORMAL
T VAGINALIS DNA VAG QL NAA+PROBE: NORMAL

## 2024-03-12 DIAGNOSIS — M32.13 SYSTEMIC LUPUS ERYTHEMATOSUS WITH LUNG INVOLVEMENT, UNSPECIFIED SLE TYPE (HCC): ICD-10-CM

## 2024-03-12 DIAGNOSIS — Z00.00 ENCOUNTER FOR PREVENTIVE CARE: ICD-10-CM

## 2024-03-13 RX ORDER — MELOXICAM 15 MG/1
TABLET ORAL
Qty: 90 TABLET | Refills: 0 | Status: SHIPPED | OUTPATIENT
Start: 2024-03-13

## 2024-03-26 ENCOUNTER — OFFICE VISIT (OUTPATIENT)
Dept: INTERNAL MEDICINE CLINIC | Age: 59
End: 2024-03-26
Payer: COMMERCIAL

## 2024-03-26 VITALS
WEIGHT: 125 LBS | BODY MASS INDEX: 20.83 KG/M2 | HEART RATE: 57 BPM | DIASTOLIC BLOOD PRESSURE: 68 MMHG | HEIGHT: 65 IN | OXYGEN SATURATION: 98 % | SYSTOLIC BLOOD PRESSURE: 112 MMHG

## 2024-03-26 DIAGNOSIS — R60.0 LOWER LEG EDEMA: Primary | ICD-10-CM

## 2024-03-26 DIAGNOSIS — F41.9 ANXIETY: ICD-10-CM

## 2024-03-26 PROCEDURE — 99213 OFFICE O/P EST LOW 20 MIN: CPT | Performed by: INTERNAL MEDICINE

## 2024-03-26 RX ORDER — HYDROXYZINE HYDROCHLORIDE 10 MG/1
10-30 TABLET, FILM COATED ORAL 3 TIMES DAILY PRN
Qty: 90 TABLET | Refills: 0 | Status: SHIPPED | OUTPATIENT
Start: 2024-03-26

## 2024-03-26 RX ORDER — LORAZEPAM 0.5 MG/1
0.5 TABLET ORAL EVERY 6 HOURS PRN
Qty: 30 TABLET | Refills: 0 | Status: SHIPPED | OUTPATIENT
Start: 2024-03-26 | End: 2024-04-25

## 2024-03-26 NOTE — PROGRESS NOTES
Gin Krueger  1965 03/26/24    SUBJECTIVE:      Pt complains of anxiety and chest tightness. Her son is in senior living for threatening a , and he is going to have a psych eval. Pt is having episodes of chest pressure.    Pt feels that over the last 10 months she has been holing on to fluids, She feels that her face is puffy, her joints are swollen, and at the end of the day she has mild lower extremity edema. She still exercises 4 days weekly.     OBJECTIVE:    /68   Pulse 57   Ht 1.651 m (5' 5\")   Wt 56.7 kg (125 lb)   LMP  (LMP Unknown)   SpO2 98%   BMI 20.80 kg/m²     Physical Exam    ASSESSMENT:    1. Lower leg edema    2. Anxiety        PLAN:    Gin was seen today for follow-up.    Diagnoses and all orders for this visit:    Lower leg edema - not significant on my exam. Will check labs, but this could be a manifestation of pts aging  -     Comprehensive Metabolic Panel; Future  -     CBC; Future  -     Sedimentation Rate; Future  -     C-Reactive Protein; Future  -     Rheumatoid Factor; Future  -     TSH with Reflex; Future  -     HINA Reflex to Antibody Cascade; Future    Anxiety - refer for counseling. Try hydroxyzine PRN and ativan PRN. Discussed risks of ativan  -     LORazepam (ATIVAN) 0.5 MG tablet; Take 1 tablet by mouth every 6 hours as needed for Anxiety for up to 30 days.    Other orders  -     hydrOXYzine HCl (ATARAX) 10 MG tablet; Take 1-3 tablets by mouth 3 times daily as needed for Anxiety

## 2024-03-27 LAB
A/G RATIO: 2 RATIO (ref 0.8–2.6)
ALBUMIN SERPL-MCNC: 4.8 G/DL (ref 3.5–5.2)
ALP BLD-CCNC: 71 U/L (ref 23–144)
ALT SERPL-CCNC: 19 U/L (ref 0–60)
AST SERPL-CCNC: 25 U/L (ref 0–55)
BILIRUB SERPL-MCNC: 0.5 MG/DL (ref 0–1.2)
BUN / CREAT RATIO: 24 (ref 7–25)
BUN BLDV-MCNC: 19 MG/DL (ref 3–29)
C REACTIVE PROTEIN (CRP), BODY FLUID: <0.3 MG/DL
CALCIUM SERPL-MCNC: 11.2 MG/DL (ref 8.5–10.5)
CHLORIDE BLD-SCNC: 98 MEQ/L (ref 96–110)
CO2: 27 MEQ/L (ref 19–32)
CREAT SERPL-MCNC: 0.8 MG/DL (ref 0.5–1.2)
ERYTHROCYTE SEDIMENTATION RATE: 5 MM/HR (ref 0–30)
ESTIMATED GLOMERULAR FILTRATION RATE CREATININE EQUATION: 85 MLS/MIN/1.73M2
FASTING STATUS: ABNORMAL
GLOBULIN: 2.4 G/DL (ref 1.9–3.6)
GLUCOSE BLD-MCNC: 103 MG/DL (ref 70–99)
HCT VFR BLD CALC: 42 % (ref 34–49)
HEMOGLOBIN: 14 G/DL (ref 11.2–15.7)
MCH RBC QN AUTO: 31 PG (ref 26–34)
MCHC RBC AUTO-ENTMCNC: 33.3 G/DL (ref 30.7–35.5)
MCV RBC AUTO: 92.9 FL (ref 80–100)
PDW BLD-RTO: 11.7 %
PLATELET # BLD: 228 K/UL (ref 140–400)
PMV BLD AUTO: 10.6 FL (ref 7.2–11.7)
POTASSIUM SERPL-SCNC: 4.2 MEQ/L (ref 3.4–5.3)
RBC # BLD: 4.52 M/UL (ref 3.95–5.26)
RHEUMATOID FACTOR: 11 IU/ML
SODIUM BLD-SCNC: 139 MEQ/L (ref 135–148)
TOTAL PROTEIN: 7.2 G/DL (ref 6–8.3)
TSH ULTRASENSITIVE: 1.21 MCIU/ML (ref 0.4–4.5)
WBC # BLD: 6.3 K/UL (ref 3.5–10.9)

## 2024-03-28 DIAGNOSIS — E83.52 SERUM CALCIUM ELEVATED: Primary | ICD-10-CM

## 2024-03-28 LAB
A/G RATIO: 2.1 RATIO (ref 0.8–2.6)
ADD ON: NORMAL
ALBUMIN SERPL-MCNC: 4.9 G/DL (ref 3.5–5.2)
ALP BLD-CCNC: 71 U/L (ref 23–144)
ALT SERPL-CCNC: 18 U/L (ref 0–60)
AST SERPL-CCNC: 26 U/L (ref 0–55)
BILIRUB SERPL-MCNC: 0.5 MG/DL (ref 0–1.2)
BILIRUBIN DIRECT: <0.2 MG/DL (ref 0–0.4)
BILIRUBIN, INDIRECT: NORMAL MG/DL (ref 0–1.2)
GLOBULIN: 2.3 G/DL (ref 1.9–3.6)
Lab: NORMAL
ORIGINAL ACCESSION NUMBER: NORMAL
PARATHYROID HORMONE INTACT: 20 PG/ML (ref 15–65)
PHOSPHORUS: 4.5 MG/DL (ref 2.5–4.5)
TOTAL PROTEIN: 7.2 G/DL (ref 6–8.3)
VITAMIN D 25-HYDROXY: 70 NG/ML (ref 30–100)

## 2024-03-29 LAB
ANA BY IFA: NORMAL
ANA PATTERN: NORMAL

## 2024-04-01 ENCOUNTER — TELEPHONE (OUTPATIENT)
Dept: INTERNAL MEDICINE CLINIC | Age: 59
End: 2024-04-01

## 2024-04-01 NOTE — TELEPHONE ENCOUNTER
Gin called with multiple questions/concerns, listed below:        1.High calcium from what, should she continue her calcium medication?  2. Parathyroid- is a scan due? What should be done next.  3. Discuss blood work...  4. She was sent to Dr. Byers, but she believes she needs a MRI of lower back for back pain- can you order that or does Dr. Byers need to order it?

## 2024-04-02 DIAGNOSIS — E83.52 SERUM CALCIUM ELEVATED: Primary | ICD-10-CM

## 2024-04-02 LAB
BUN / CREAT RATIO: 24 (ref 7–25)
BUN BLDV-MCNC: 17 MG/DL (ref 3–29)
CALCIUM SERPL-MCNC: 9.4 MG/DL (ref 8.5–10.5)
CHLORIDE BLD-SCNC: 103 MEQ/L (ref 96–110)
CO2: 28 MEQ/L (ref 19–32)
CREAT SERPL-MCNC: 0.7 MG/DL (ref 0.5–1.2)
ESTIMATED GLOMERULAR FILTRATION RATE CREATININE EQUATION: 100 MLS/MIN/1.73M2
FASTING STATUS: NORMAL
GLUCOSE BLD-MCNC: 75 MG/DL (ref 70–99)
PARATHYROID HORMONE INTACT: 44 PG/ML (ref 15–65)
POTASSIUM SERPL-SCNC: 4.2 MEQ/L (ref 3.4–5.3)
SODIUM BLD-SCNC: 142 MEQ/L (ref 135–148)

## 2024-04-09 LAB
BUN / CREAT RATIO: NORMAL (ref 7–25)
BUN / CREAT RATIO: NORMAL (ref 7–25)
BUN BLDV-MCNC: NORMAL MG/DL (ref 3–29)
BUN BLDV-MCNC: NORMAL MG/DL (ref 3–29)
CALCIUM SERPL-MCNC: NORMAL MG/DL (ref 8.5–10.5)
CALCIUM SERPL-MCNC: NORMAL MG/DL (ref 8.5–10.5)
CHLORIDE BLD-SCNC: NORMAL MEQ/L (ref 96–110)
CHLORIDE BLD-SCNC: NORMAL MEQ/L (ref 96–110)
CO2: NORMAL MEQ/L (ref 19–32)
CO2: NORMAL MEQ/L (ref 19–32)
CREAT SERPL-MCNC: NORMAL MG/DL (ref 0.5–1.2)
CREAT SERPL-MCNC: NORMAL MG/DL (ref 0.5–1.2)
ESTIMATED GLOMERULAR FILTRATION RATE CREATININE EQUATION: NORMAL MLS/MIN/1.73M2
ESTIMATED GLOMERULAR FILTRATION RATE CREATININE EQUATION: NORMAL MLS/MIN/1.73M2
FASTING STATUS: NORMAL
FASTING STATUS: NORMAL
GLUCOSE BLD-MCNC: NORMAL MG/DL (ref 70–99)
GLUCOSE BLD-MCNC: NORMAL MG/DL (ref 70–99)
POTASSIUM SERPL-SCNC: NORMAL MEQ/L (ref 3.4–5.3)
POTASSIUM SERPL-SCNC: NORMAL MEQ/L (ref 3.4–5.3)
SODIUM BLD-SCNC: NORMAL MEQ/L (ref 135–148)
SODIUM BLD-SCNC: NORMAL MEQ/L (ref 135–148)

## 2024-04-10 LAB
BUN / CREAT RATIO: 20 (ref 7–25)
BUN BLDV-MCNC: 16 MG/DL (ref 3–29)
CALCIUM SERPL-MCNC: 9 MG/DL (ref 8.5–10.5)
CHLORIDE BLD-SCNC: 101 MEQ/L (ref 96–110)
CO2: 29 MEQ/L (ref 19–32)
CREAT SERPL-MCNC: 0.8 MG/DL (ref 0.5–1.2)
ESTIMATED GLOMERULAR FILTRATION RATE CREATININE EQUATION: 85 MLS/MIN/1.73M2
FASTING STATUS: NORMAL
GLUCOSE BLD-MCNC: 98 MG/DL (ref 70–99)
POTASSIUM SERPL-SCNC: 4.5 MEQ/L (ref 3.4–5.3)
SODIUM BLD-SCNC: 139 MEQ/L (ref 135–148)

## 2024-08-27 ENCOUNTER — OFFICE VISIT (OUTPATIENT)
Dept: INTERNAL MEDICINE CLINIC | Age: 59
End: 2024-08-27
Payer: COMMERCIAL

## 2024-08-27 VITALS
HEART RATE: 65 BPM | TEMPERATURE: 98.7 F | BODY MASS INDEX: 20.6 KG/M2 | SYSTOLIC BLOOD PRESSURE: 110 MMHG | OXYGEN SATURATION: 95 % | WEIGHT: 123.8 LBS | DIASTOLIC BLOOD PRESSURE: 60 MMHG

## 2024-08-27 DIAGNOSIS — R50.9 FEVER, UNSPECIFIED FEVER CAUSE: Primary | ICD-10-CM

## 2024-08-27 PROCEDURE — G2211 COMPLEX E/M VISIT ADD ON: HCPCS | Performed by: INTERNAL MEDICINE

## 2024-08-27 PROCEDURE — 99214 OFFICE O/P EST MOD 30 MIN: CPT | Performed by: INTERNAL MEDICINE

## 2024-08-27 SDOH — ECONOMIC STABILITY: FOOD INSECURITY: WITHIN THE PAST 12 MONTHS, THE FOOD YOU BOUGHT JUST DIDN'T LAST AND YOU DIDN'T HAVE MONEY TO GET MORE.: NEVER TRUE

## 2024-08-27 SDOH — ECONOMIC STABILITY: FOOD INSECURITY: WITHIN THE PAST 12 MONTHS, YOU WORRIED THAT YOUR FOOD WOULD RUN OUT BEFORE YOU GOT MONEY TO BUY MORE.: NEVER TRUE

## 2024-08-27 SDOH — ECONOMIC STABILITY: INCOME INSECURITY: HOW HARD IS IT FOR YOU TO PAY FOR THE VERY BASICS LIKE FOOD, HOUSING, MEDICAL CARE, AND HEATING?: NOT HARD AT ALL

## 2024-08-27 NOTE — PROGRESS NOTES
Gin Krueger  1965 08/27/24    SUBJECTIVE:      Saturday pt developed fever 101.8, weakness, neck pain, joint pain, hand and wrist pain, headache, chills. Her joints are warm but not swollen.      She denies ear pain, sinus pain, rhinorrhea, nasal congestion, cough,SOB, wheezing, dysuria,hematuria, N/V, diarrhea, abd pain.     She tested (-) for covid.     She has used tylenol with improvement but not resolution of the symptoms.     OBJECTIVE:    /60 (Site: Left Upper Arm, Position: Sitting, Cuff Size: Large Adult)   Pulse 65   Temp 98.7 °F (37.1 °C) (Oral)   Wt 56.2 kg (123 lb 12.8 oz)   LMP  (LMP Unknown)   SpO2 95%   BMI 20.60 kg/m²     Physical Exam  Constitutional:       Appearance: She is well-developed.   Eyes:      General: No scleral icterus.     Conjunctiva/sclera: Conjunctivae normal.   Neck:      Thyroid: No thyromegaly.      Trachea: No tracheal deviation.   Cardiovascular:      Rate and Rhythm: Normal rate and regular rhythm.      Heart sounds: No murmur heard.     No friction rub. No gallop.   Pulmonary:      Effort: No respiratory distress.      Breath sounds: No wheezing or rales.   Abdominal:      General: Bowel sounds are normal. There is no distension.      Palpations: Abdomen is soft. There is no hepatomegaly or mass.      Tenderness: There is no abdominal tenderness. There is no guarding or rebound.   Musculoskeletal:      Cervical back: Neck supple.   Lymphadenopathy:      Cervical: No cervical adenopathy.   Skin:     Nails: There is no clubbing.   Neurological:      Mental Status: She is alert and oriented to person, place, and time.   Psychiatric:         Behavior: Behavior normal.         Judgment: Judgment normal.         ASSESSMENT:    1. Fever, unspecified fever cause        PLAN:    Gin was seen today for fever, headache and fatigue.    Diagnoses and all orders for this visit:    Fever, unspecified fever cause - nonspecific symptoms, but seems to have some

## 2024-08-28 LAB
A/G RATIO: 1.8 RATIO (ref 0.8–2.6)
ALBUMIN: 4.2 G/DL (ref 3.5–5.2)
ALP BLD-CCNC: 73 U/L (ref 23–144)
ALT SERPL-CCNC: 14 U/L (ref 0–60)
AST SERPL-CCNC: 19 U/L (ref 0–55)
BASOPHILS ABSOLUTE: 0 K/UL (ref 0–0.3)
BASOPHILS RELATIVE PERCENT: 0.3 % (ref 0–2)
BILIRUB SERPL-MCNC: 0.4 MG/DL (ref 0–1.2)
BUN / CREAT RATIO: 11 (ref 7–25)
BUN BLDV-MCNC: 8 MG/DL (ref 3–29)
CALCIUM SERPL-MCNC: 9.2 MG/DL (ref 8.5–10.5)
CHLORIDE BLD-SCNC: 97 MEQ/L (ref 96–110)
CO2: 26 MEQ/L (ref 19–32)
CREAT SERPL-MCNC: 0.7 MG/DL (ref 0.5–1.2)
DIFFERENTIAL COUNT: NORMAL
EOSINOPHILS ABSOLUTE: 0.1 K/UL (ref 0–0.5)
EOSINOPHILS RELATIVE PERCENT: 1.3 % (ref 0–5)
ESTIMATED GLOMERULAR FILTRATION RATE CREATININE EQUATION: 100 MLS/MIN/1.73M2
FASTING STATUS: NORMAL
GLOBULIN: 2.3 G/DL (ref 1.9–3.6)
GLUCOSE BLD-MCNC: 95 MG/DL (ref 70–99)
HCT VFR BLD CALC: 41.5 % (ref 34–49)
HEMOGLOBIN: 13.8 G/DL (ref 11.2–15.7)
IMMATURE GRANS (ABS): 0 K/UL (ref 0–0.1)
IMMATURE GRANULOCYTES %: 0.2 %
LYMPHOCYTES ABSOLUTE: 1.3 K/UL (ref 0.9–4.1)
LYMPHOCYTES RELATIVE PERCENT: 20.7 % (ref 14–51)
MCH RBC QN AUTO: 30.3 PG (ref 26–34)
MCHC RBC AUTO-ENTMCNC: 33.3 G/DL (ref 30.7–35.5)
MCV RBC AUTO: 91.2 FL (ref 80–100)
MONOCYTES ABSOLUTE: 0.5 K/UL (ref 0.2–1)
MONOCYTES RELATIVE PERCENT: 7.8 % (ref 4–12)
MONOSPOT: NEGATIVE
NEUTROPHILS ABSOLUTE: 4.4 K/UL (ref 1.8–7.5)
NEUTROPHILS RELATIVE PERCENT: 69.7 % (ref 42–80)
PDW BLD-RTO: 11.4 %
PLATELET # BLD: 187 K/UL (ref 140–400)
PMV BLD AUTO: 10.9 FL (ref 7.2–11.7)
POTASSIUM SERPL-SCNC: 4.5 MEQ/L (ref 3.4–5.3)
RBC # BLD: 4.55 M/UL (ref 3.95–5.26)
RETICULOCYTE ABSOLUTE COUNT: 0 /100 WBC
SODIUM BLD-SCNC: 135 MEQ/L (ref 135–148)
TOTAL PROTEIN: 6.5 G/DL (ref 6–8.3)
WBC # BLD: 6.4 K/UL (ref 3.5–10.9)

## 2024-08-29 LAB
ANA BY IFA: NORMAL
ANA BY IFA: NORMAL
ANA PATTERN: NORMAL
ANA PATTERN: NORMAL

## 2024-08-30 LAB
CENTROMERE AB IGG: <20 EIA
DSDNA ANTIBODY: <201 EIA
ENA TO SCL-70 ANTIBODY: <20 EIA
ENA TO SSA (RO) ANTIBODY: <20 EIA
ENA TO SSB (LA) ANTIBODY: <20 EIA
HISTONE ANTIBODY: <1 EIA
JO-1 ANTIBODY: <20 EIA
SM AB: <20 EIA
SMITH + RIBONUCLEOPROTEIN EXTRACTABLE NUCLEAR AB: <20 EIA

## 2024-09-14 DIAGNOSIS — E03.4 HYPOTHYROIDISM DUE TO ACQUIRED ATROPHY OF THYROID: ICD-10-CM

## 2024-09-16 RX ORDER — LEVOTHYROXINE SODIUM 50 UG/1
50 TABLET ORAL DAILY
Qty: 90 TABLET | Refills: 3 | Status: SHIPPED | OUTPATIENT
Start: 2024-09-16

## 2024-09-23 ENCOUNTER — HOSPITAL ENCOUNTER (OUTPATIENT)
Age: 59
Setting detail: SPECIMEN
Discharge: HOME | End: 2024-09-23
Payer: COMMERCIAL

## 2024-09-23 ENCOUNTER — OFFICE VISIT (OUTPATIENT)
Dept: OBGYN | Age: 59
End: 2024-09-23
Payer: COMMERCIAL

## 2024-09-23 VITALS
DIASTOLIC BLOOD PRESSURE: 79 MMHG | SYSTOLIC BLOOD PRESSURE: 120 MMHG | BODY MASS INDEX: 20.49 KG/M2 | HEIGHT: 65 IN | WEIGHT: 123 LBS

## 2024-09-23 DIAGNOSIS — D25.1 FIBROIDS, INTRAMURAL: ICD-10-CM

## 2024-09-23 DIAGNOSIS — N63.42 SUBAREOLAR MASS OF LEFT BREAST: ICD-10-CM

## 2024-09-23 DIAGNOSIS — Z80.3 FAMILY HISTORY OF BREAST CANCER: ICD-10-CM

## 2024-09-23 DIAGNOSIS — R87.810 CERVICAL HIGH RISK HUMAN PAPILLOMAVIRUS (HPV) DNA TEST POSITIVE: ICD-10-CM

## 2024-09-23 DIAGNOSIS — N83.201 RIGHT OVARIAN CYST: ICD-10-CM

## 2024-09-23 DIAGNOSIS — Z01.419 ENCOUNTER FOR ANNUAL ROUTINE GYNECOLOGICAL EXAMINATION: Primary | ICD-10-CM

## 2024-09-23 DIAGNOSIS — Z11.51 ENCOUNTER FOR SCREENING FOR HUMAN PAPILLOMAVIRUS (HPV): ICD-10-CM

## 2024-09-23 PROCEDURE — 99396 PREV VISIT EST AGE 40-64: CPT | Performed by: OBSTETRICS & GYNECOLOGY

## 2024-09-23 PROCEDURE — G0123 SCREEN CERV/VAG THIN LAYER: HCPCS

## 2024-09-25 LAB
COMMENT: NORMAL
GYNECOLOGY CYTOLOGY REPORT: NORMAL
HPV OTHER HR TYPES: NOT DETECTED
HPV TYPE 16: NOT DETECTED
HPV TYPE 18: NOT DETECTED

## 2024-10-02 DIAGNOSIS — N63.42 SUBAREOLAR MASS OF LEFT BREAST: ICD-10-CM

## 2024-10-04 DIAGNOSIS — Z00.00 ENCOUNTER FOR PREVENTIVE CARE: Primary | ICD-10-CM

## 2024-10-07 LAB
CHOLESTEROL, TOTAL: 185 MG/DL
HDLC SERPL-MCNC: 78 MG/DL
LDL CHOLESTEROL: 95 MG/DL
TRIGL SERPL-MCNC: 61 MG/DL
VLDLC SERPL CALC-MCNC: 12 MG/DL (ref 4–38)

## 2024-10-20 DIAGNOSIS — Z00.00 ENCOUNTER FOR PREVENTIVE CARE: ICD-10-CM

## 2024-10-20 DIAGNOSIS — M32.13 SYSTEMIC LUPUS ERYTHEMATOSUS WITH LUNG INVOLVEMENT, UNSPECIFIED SLE TYPE (HCC): ICD-10-CM

## 2024-10-21 RX ORDER — MELOXICAM 15 MG/1
TABLET ORAL
Qty: 90 TABLET | Refills: 0 | Status: SHIPPED | OUTPATIENT
Start: 2024-10-21

## 2025-01-07 ENCOUNTER — OFFICE VISIT (OUTPATIENT)
Dept: INTERNAL MEDICINE CLINIC | Age: 60
End: 2025-01-07
Payer: COMMERCIAL

## 2025-01-07 VITALS
SYSTOLIC BLOOD PRESSURE: 138 MMHG | DIASTOLIC BLOOD PRESSURE: 80 MMHG | RESPIRATION RATE: 18 BRPM | OXYGEN SATURATION: 100 % | HEART RATE: 65 BPM

## 2025-01-07 DIAGNOSIS — M54.50 ACUTE BILATERAL LOW BACK PAIN WITHOUT SCIATICA: Primary | ICD-10-CM

## 2025-01-07 DIAGNOSIS — M54.2 NECK PAIN: ICD-10-CM
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
## 2025-01-07 DIAGNOSIS — F41.9 ANXIETY: ICD-10-CM

## 2025-01-07 PROCEDURE — G2211 COMPLEX E/M VISIT ADD ON: HCPCS | Performed by: INTERNAL MEDICINE

## 2025-01-07 PROCEDURE — 99213 OFFICE O/P EST LOW 20 MIN: CPT | Performed by: INTERNAL MEDICINE

## 2025-01-07 RX ORDER — TIZANIDINE 2 MG/1
2-4 TABLET ORAL 3 TIMES DAILY PRN
Qty: 90 TABLET | Refills: 1 | Status: SHIPPED | OUTPATIENT
Start: 2025-01-07

## 2025-01-07 RX ORDER — LORAZEPAM 0.5 MG/1
0.5 TABLET ORAL EVERY 6 HOURS PRN
Qty: 30 TABLET | Refills: 0 | Status: SHIPPED | OUTPATIENT
Start: 2025-01-07 | End: 2025-02-06

## 2025-01-07 ASSESSMENT — PATIENT HEALTH QUESTIONNAIRE - PHQ9
2. FEELING DOWN, DEPRESSED OR HOPELESS: NOT AT ALL
8. MOVING OR SPEAKING SO SLOWLY THAT OTHER PEOPLE COULD HAVE NOTICED. OR THE OPPOSITE, BEING SO FIGETY OR RESTLESS THAT YOU HAVE BEEN MOVING AROUND A LOT MORE THAN USUAL: NOT AT ALL
SUM OF ALL RESPONSES TO PHQ QUESTIONS 1-9: 5
4. FEELING TIRED OR HAVING LITTLE ENERGY: SEVERAL DAYS
2. FEELING DOWN, DEPRESSED OR HOPELESS: NOT AT ALL
6. FEELING BAD ABOUT YOURSELF - OR THAT YOU ARE A FAILURE OR HAVE LET YOURSELF OR YOUR FAMILY DOWN: NOT AT ALL
6. FEELING BAD ABOUT YOURSELF - OR THAT YOU ARE A FAILURE OR HAVE LET YOURSELF OR YOUR FAMILY DOWN: NOT AT ALL
5. POOR APPETITE OR OVEREATING: NOT AT ALL
7. TROUBLE CONCENTRATING ON THINGS, SUCH AS READING THE NEWSPAPER OR WATCHING TELEVISION: NOT AT ALL
1. LITTLE INTEREST OR PLEASURE IN DOING THINGS: NEARLY EVERY DAY
SUM OF ALL RESPONSES TO PHQ9 QUESTIONS 1 & 2: 3
3. TROUBLE FALLING OR STAYING ASLEEP: SEVERAL DAYS
10. IF YOU CHECKED OFF ANY PROBLEMS, HOW DIFFICULT HAVE THESE PROBLEMS MADE IT FOR YOU TO DO YOUR WORK, TAKE CARE OF THINGS AT HOME, OR GET ALONG WITH OTHER PEOPLE: SOMEWHAT DIFFICULT
10. IF YOU CHECKED OFF ANY PROBLEMS, HOW DIFFICULT HAVE THESE PROBLEMS MADE IT FOR YOU TO DO YOUR WORK, TAKE CARE OF THINGS AT HOME, OR GET ALONG WITH OTHER PEOPLE: SOMEWHAT DIFFICULT
9. THOUGHTS THAT YOU WOULD BE BETTER OFF DEAD, OR OF HURTING YOURSELF: NOT AT ALL
8. MOVING OR SPEAKING SO SLOWLY THAT OTHER PEOPLE COULD HAVE NOTICED. OR THE OPPOSITE - BEING SO FIDGETY OR RESTLESS THAT YOU HAVE BEEN MOVING AROUND A LOT MORE THAN USUAL: NOT AT ALL
9. THOUGHTS THAT YOU WOULD BE BETTER OFF DEAD, OR OF HURTING YOURSELF: NOT AT ALL
SUM OF ALL RESPONSES TO PHQ QUESTIONS 1-9: 5
SUM OF ALL RESPONSES TO PHQ QUESTIONS 1-9: 5
4. FEELING TIRED OR HAVING LITTLE ENERGY: SEVERAL DAYS
3. TROUBLE FALLING OR STAYING ASLEEP: SEVERAL DAYS
1. LITTLE INTEREST OR PLEASURE IN DOING THINGS: NEARLY EVERY DAY
SUM OF ALL RESPONSES TO PHQ QUESTIONS 1-9: 5
SUM OF ALL RESPONSES TO PHQ9 QUESTIONS 1 & 2: 3
SUM OF ALL RESPONSES TO PHQ QUESTIONS 1-9: 5
5. POOR APPETITE OR OVEREATING: NOT AT ALL
7. TROUBLE CONCENTRATING ON THINGS, SUCH AS READING THE NEWSPAPER OR WATCHING TELEVISION: NOT AT ALL
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
## 2025-03-28 ENCOUNTER — RESULTS FOLLOW-UP (OUTPATIENT)
Dept: OBGYN | Age: 60
End: 2025-03-28

## 2025-03-28 LAB — MAMMOGRAPHY, EXTERNAL: NORMAL

## 2025-04-29 ENCOUNTER — OFFICE VISIT (OUTPATIENT)
Dept: INTERNAL MEDICINE CLINIC | Age: 60
End: 2025-04-29
Payer: COMMERCIAL

## 2025-04-29 VITALS
DIASTOLIC BLOOD PRESSURE: 60 MMHG | HEART RATE: 87 BPM | WEIGHT: 121.4 LBS | BODY MASS INDEX: 20.2 KG/M2 | SYSTOLIC BLOOD PRESSURE: 102 MMHG | OXYGEN SATURATION: 99 %

## 2025-04-29 DIAGNOSIS — R10.9 FLANK PAIN: Primary | ICD-10-CM

## 2025-04-29 PROCEDURE — G2211 COMPLEX E/M VISIT ADD ON: HCPCS | Performed by: INTERNAL MEDICINE

## 2025-04-29 PROCEDURE — 99214 OFFICE O/P EST MOD 30 MIN: CPT | Performed by: INTERNAL MEDICINE

## 2025-04-29 NOTE — PROGRESS NOTES
Gin Krueger  1965 04/29/25    SUBJECTIVE:      Pt woke this morning with right sided back pain. This progressed over the day. There were paroxysms of severe pain.This then radiated into the right flank.. She had nausea without vomiting. She was unable to find a comfortable position.she denies any hematuria, rash  She drank significant amounts of water. Over the past 30 minutes to pain has almost resolved.     OBJECTIVE:    /60 (BP Site: Right Upper Arm, Patient Position: Sitting, BP Cuff Size: Medium Adult)   Pulse 87   Wt 55.1 kg (121 lb 6.4 oz)   LMP  (LMP Unknown)   SpO2 99%   BMI 20.20 kg/m²     Physical Exam  Constitutional:       Appearance: She is well-developed.   Eyes:      General: No scleral icterus.     Conjunctiva/sclera: Conjunctivae normal.   Neck:      Thyroid: No thyromegaly.      Trachea: No tracheal deviation.   Cardiovascular:      Rate and Rhythm: Normal rate and regular rhythm.      Heart sounds: No murmur heard.     No friction rub. No gallop.   Pulmonary:      Effort: No respiratory distress.      Breath sounds: No wheezing or rales.   Abdominal:      General: Bowel sounds are normal. There is no distension.      Palpations: Abdomen is soft. There is no hepatomegaly or mass.      Tenderness: There is no abdominal tenderness. There is no guarding or rebound.   Musculoskeletal:      Cervical back: Neck supple.   Lymphadenopathy:      Cervical: No cervical adenopathy.   Skin:     Nails: There is no clubbing.   Neurological:      Mental Status: She is alert and oriented to person, place, and time.   Psychiatric:         Behavior: Behavior normal.         Judgment: Judgment normal.         ASSESSMENT:    1. Flank pain        PLAN:    Gin was seen today for flank pain.    Diagnoses and all orders for this visit:    Flank pain-I suspect the patient had nephrolithiasis which she spontaneously passed.  Discussed options.  We could do a scan but they think there is a very good

## 2025-04-30 ENCOUNTER — RESULTS FOLLOW-UP (OUTPATIENT)
Dept: INTERNAL MEDICINE CLINIC | Age: 60
End: 2025-04-30

## 2025-04-30 DIAGNOSIS — R10.9 FLANK PAIN: Primary | ICD-10-CM

## 2025-04-30 LAB
BACTERIA URNS QL MICRO: NORMAL /HPF
BILIRUB UR QL STRIP.AUTO: NEGATIVE
CLARITY UR: CLEAR
COLOR UR: YELLOW
EPI CELLS #/AREA URNS AUTO: 0 /HPF (ref 0–5)
GLUCOSE UR STRIP.AUTO-MCNC: NEGATIVE MG/DL
HGB UR QL STRIP.AUTO: NEGATIVE
HYALINE CASTS #/AREA URNS AUTO: 0 /LPF (ref 0–8)
KETONES UR STRIP.AUTO-MCNC: NEGATIVE MG/DL
LEUKOCYTE ESTERASE UR QL STRIP.AUTO: ABNORMAL
NITRITE UR QL STRIP.AUTO: NEGATIVE
PH UR STRIP.AUTO: 7 [PH] (ref 5–8)
PROT UR STRIP.AUTO-MCNC: NEGATIVE MG/DL
RBC CLUMPS #/AREA URNS AUTO: 0 /HPF (ref 0–4)
SP GR UR STRIP.AUTO: 1.01 (ref 1–1.03)
UA DIPSTICK W REFLEX MICRO PNL UR: YES
URN SPEC COLLECT METH UR: ABNORMAL
UROBILINOGEN UR STRIP-ACNC: 0.2 E.U./DL
WBC #/AREA URNS AUTO: 0 /HPF (ref 0–5)

## 2025-05-01 LAB — BACTERIA UR CULT: NORMAL

## 2025-05-03 DIAGNOSIS — Z00.00 ENCOUNTER FOR PREVENTIVE CARE: ICD-10-CM

## 2025-05-03 DIAGNOSIS — M32.13 SYSTEMIC LUPUS ERYTHEMATOSUS WITH LUNG INVOLVEMENT, UNSPECIFIED SLE TYPE (HCC): ICD-10-CM

## 2025-05-05 RX ORDER — MELOXICAM 15 MG/1
15 TABLET ORAL DAILY PRN
Qty: 90 TABLET | Refills: 0 | Status: SHIPPED | OUTPATIENT
Start: 2025-05-05

## 2025-07-25 ENCOUNTER — HOSPITAL ENCOUNTER (OUTPATIENT)
Age: 60
Setting detail: SPECIMEN
Discharge: HOME | End: 2025-07-25
Payer: COMMERCIAL

## 2025-07-25 LAB
ADV 40+41 DNA STL QL NAA+NON-PROBE: NOT DETECTED
C CAYETANENSIS DNA STL QL NAA+NON-PROBE: NOT DETECTED
C COLI+JEJ+UPSA DNA STL QL NAA+NON-PROBE: NOT DETECTED
CRYPTOSP DNA STL QL NAA+NON-PROBE: NOT DETECTED
E COLI O157 DNA STL QL NAA+NON-PROBE: NOT DETECTED
E HISTOLYT DNA STL QL NAA+NON-PROBE: NOT DETECTED
EAEC PAA PLAS AGGR+AATA ST NAA+NON-PRB: NOT DETECTED
EC STX1+STX2 GENES STL QL NAA+NON-PROBE: NOT DETECTED
EPEC EAE GENE STL QL NAA+NON-PROBE: NOT DETECTED
ETEC LTA+ST1A+ST1B TOX ST NAA+NON-PROBE: NOT DETECTED
G LAMBLIA DNA STL QL NAA+NON-PROBE: NOT DETECTED
GI PATH DNA+RNA PNL STL NAA+NON-PROBE: NOT DETECTED
NOROVIRUS GI+II RNA STL QL NAA+NON-PROBE: NOT DETECTED
P SHIGELLOIDES DNA STL QL NAA+NON-PROBE: NOT DETECTED
RVA RNA STL QL NAA+NON-PROBE: NOT DETECTED
S ENT+BONG DNA STL QL NAA+NON-PROBE: NOT DETECTED
SAPO I+II+IV+V RNA STL QL NAA+NON-PROBE: NOT DETECTED
SOURCE: NORMAL
V CHOL+PARA+VUL DNA STL QL NAA+NON-PROBE: NOT DETECTED
V CHOLERAE DNA STL QL NAA+NON-PROBE: NOT DETECTED
Y ENTEROCOL DNA STL QL NAA+NON-PROBE: NOT DETECTED

## 2025-07-25 PROCEDURE — 87507 IADNA-DNA/RNA PROBE TQ 12-25: CPT

## 2025-07-25 PROCEDURE — 87449 NOS EACH ORGANISM AG IA: CPT

## 2025-07-25 PROCEDURE — 87324 CLOSTRIDIUM AG IA: CPT

## 2025-07-26 LAB
C DIFF GDH + TOXINS A+B STL QL IA.RAPID: NEGATIVE
SPECIMEN DESCRIPTION: NORMAL

## 2025-08-07 ENCOUNTER — TELEPHONE (OUTPATIENT)
Dept: INTERNAL MEDICINE CLINIC | Age: 60
End: 2025-08-07

## 2025-08-11 ENCOUNTER — OFFICE VISIT (OUTPATIENT)
Dept: INTERNAL MEDICINE CLINIC | Age: 60
End: 2025-08-11
Payer: COMMERCIAL

## 2025-08-11 VITALS
SYSTOLIC BLOOD PRESSURE: 96 MMHG | BODY MASS INDEX: 18.99 KG/M2 | WEIGHT: 114 LBS | OXYGEN SATURATION: 100 % | HEIGHT: 65 IN | RESPIRATION RATE: 16 BRPM | DIASTOLIC BLOOD PRESSURE: 62 MMHG | HEART RATE: 57 BPM

## 2025-08-11 DIAGNOSIS — R10.9 ABDOMINAL CRAMPING: ICD-10-CM

## 2025-08-11 DIAGNOSIS — R19.7 FREQUENT DIARRHEA: Primary | ICD-10-CM

## 2025-08-11 PROCEDURE — 99214 OFFICE O/P EST MOD 30 MIN: CPT | Performed by: NURSE PRACTITIONER

## 2025-08-11 PROCEDURE — G2211 COMPLEX E/M VISIT ADD ON: HCPCS | Performed by: NURSE PRACTITIONER

## 2025-08-11 RX ORDER — DICYCLOMINE HYDROCHLORIDE 10 MG/1
10 CAPSULE ORAL
Qty: 90 CAPSULE | Refills: 0 | Status: SHIPPED | OUTPATIENT
Start: 2025-08-11

## 2025-08-11 RX ORDER — OMEPRAZOLE 40 MG/1
40 CAPSULE, DELAYED RELEASE ORAL
Qty: 30 CAPSULE | Refills: 0 | Status: SHIPPED | OUTPATIENT
Start: 2025-08-11

## 2025-08-11 SDOH — ECONOMIC STABILITY: FOOD INSECURITY: WITHIN THE PAST 12 MONTHS, THE FOOD YOU BOUGHT JUST DIDN'T LAST AND YOU DIDN'T HAVE MONEY TO GET MORE.: NEVER TRUE

## 2025-08-11 SDOH — ECONOMIC STABILITY: FOOD INSECURITY: WITHIN THE PAST 12 MONTHS, YOU WORRIED THAT YOUR FOOD WOULD RUN OUT BEFORE YOU GOT MONEY TO BUY MORE.: NEVER TRUE

## 2025-08-11 ASSESSMENT — ENCOUNTER SYMPTOMS
SINUS PAIN: 0
ABDOMINAL PAIN: 1
COUGH: 0
SHORTNESS OF BREATH: 0
COLOR CHANGE: 0
VOMITING: 0
DIARRHEA: 1
CHEST TIGHTNESS: 0
NAUSEA: 1
SINUS PRESSURE: 0
APNEA: 0

## 2025-08-12 LAB
C REACTIVE PROTEIN (CRP), BODY FLUID: <0.3 MG/DL
C REACTIVE PROTEIN (CRP), BODY FLUID: NORMAL MG/DL
COMMENT: NORMAL
LIPASE: 69 U/L (ref 0–60)
LIPASE: NORMAL U/L (ref 0–60)
MONOSPOT: NORMAL
SED RATE, AUTOMATED: 2 MM/HR (ref 0–30)
SED RATE, AUTOMATED: NORMAL MM/HR (ref 0–30)

## 2025-08-13 LAB — MONOSPOT: NEGATIVE

## 2025-08-15 ENCOUNTER — TELEPHONE (OUTPATIENT)
Dept: INTERNAL MEDICINE CLINIC | Age: 60
End: 2025-08-15

## 2025-08-15 LAB — CALPROTECTIN, FECAL: 376 MCG/G
